# Patient Record
Sex: FEMALE | Race: WHITE | NOT HISPANIC OR LATINO
[De-identification: names, ages, dates, MRNs, and addresses within clinical notes are randomized per-mention and may not be internally consistent; named-entity substitution may affect disease eponyms.]

---

## 2018-04-11 ENCOUNTER — APPOINTMENT (OUTPATIENT)
Dept: VASCULAR SURGERY | Facility: CLINIC | Age: 68
End: 2018-04-11
Payer: MEDICARE

## 2018-04-11 PROBLEM — Z00.00 ENCOUNTER FOR PREVENTIVE HEALTH EXAMINATION: Status: ACTIVE | Noted: 2018-04-11

## 2018-04-11 PROCEDURE — 93970 EXTREMITY STUDY: CPT

## 2019-03-21 ENCOUNTER — RX RENEWAL (OUTPATIENT)
Age: 69
End: 2019-03-21

## 2019-03-21 DIAGNOSIS — J10.1 INFLUENZA DUE TO OTHER IDENTIFIED INFLUENZA VIRUS WITH OTHER RESPIRATORY MANIFESTATIONS: ICD-10-CM

## 2020-09-25 ENCOUNTER — APPOINTMENT (OUTPATIENT)
Dept: CARDIOLOGY | Facility: CLINIC | Age: 70
End: 2020-09-25
Payer: MEDICARE

## 2020-09-25 ENCOUNTER — NON-APPOINTMENT (OUTPATIENT)
Age: 70
End: 2020-09-25

## 2020-09-25 VITALS
HEIGHT: 61 IN | BODY MASS INDEX: 31.72 KG/M2 | RESPIRATION RATE: 15 BRPM | DIASTOLIC BLOOD PRESSURE: 94 MMHG | HEART RATE: 70 BPM | SYSTOLIC BLOOD PRESSURE: 139 MMHG | WEIGHT: 168 LBS

## 2020-09-25 DIAGNOSIS — R07.9 CHEST PAIN, UNSPECIFIED: ICD-10-CM

## 2020-09-25 PROCEDURE — 93306 TTE W/DOPPLER COMPLETE: CPT

## 2020-09-25 PROCEDURE — 93015 CV STRESS TEST SUPVJ I&R: CPT

## 2020-09-25 PROCEDURE — ZZZZZ: CPT

## 2020-09-25 PROCEDURE — 99203 OFFICE O/P NEW LOW 30 MIN: CPT | Mod: 25

## 2020-10-05 PROBLEM — R07.9 CHEST PAIN: Status: ACTIVE | Noted: 2020-09-25

## 2021-05-06 ENCOUNTER — APPOINTMENT (OUTPATIENT)
Dept: SURGERY | Facility: CLINIC | Age: 71
End: 2021-05-06
Payer: MEDICARE

## 2021-05-06 VITALS
SYSTOLIC BLOOD PRESSURE: 164 MMHG | RESPIRATION RATE: 15 BRPM | WEIGHT: 168 LBS | HEART RATE: 68 BPM | DIASTOLIC BLOOD PRESSURE: 84 MMHG | BODY MASS INDEX: 32.98 KG/M2 | HEIGHT: 60 IN | OXYGEN SATURATION: 98 % | TEMPERATURE: 97.7 F

## 2021-05-06 DIAGNOSIS — Z80.3 FAMILY HISTORY OF MALIGNANT NEOPLASM OF BREAST: ICD-10-CM

## 2021-05-06 DIAGNOSIS — K57.90 DIVERTICULOSIS OF INTESTINE, PART UNSPECIFIED, W/OUT PERFORATION OR ABSCESS W/OUT BLEEDING: ICD-10-CM

## 2021-05-06 DIAGNOSIS — Z80.0 FAMILY HISTORY OF MALIGNANT NEOPLASM OF DIGESTIVE ORGANS: ICD-10-CM

## 2021-05-06 DIAGNOSIS — K64.8 OTHER HEMORRHOIDS: ICD-10-CM

## 2021-05-06 DIAGNOSIS — K80.20 CALCULUS OF GALLBLADDER W/OUT CHOLECYSTITIS W/OUT OBSTRUCTION: ICD-10-CM

## 2021-05-06 DIAGNOSIS — K42.9 UMBILICAL HERNIA W/OUT OBSTRUCTION OR GANGRENE: ICD-10-CM

## 2021-05-06 DIAGNOSIS — D17.5 BENIGN LIPOMATOUS NEOPLASM OF INTRA-ABDOMINAL ORGANS: ICD-10-CM

## 2021-05-06 DIAGNOSIS — K63.9 DISEASE OF INTESTINE, UNSPECIFIED: ICD-10-CM

## 2021-05-06 DIAGNOSIS — Z63.4 DISAPPEARANCE AND DEATH OF FAMILY MEMBER: ICD-10-CM

## 2021-05-06 DIAGNOSIS — Z80.1 FAMILY HISTORY OF MALIGNANT NEOPLASM OF TRACHEA, BRONCHUS AND LUNG: ICD-10-CM

## 2021-05-06 DIAGNOSIS — Z78.9 OTHER SPECIFIED HEALTH STATUS: ICD-10-CM

## 2021-05-06 PROCEDURE — 99072 ADDL SUPL MATRL&STAF TM PHE: CPT

## 2021-05-06 PROCEDURE — 99204 OFFICE O/P NEW MOD 45 MIN: CPT

## 2021-05-06 RX ORDER — ZOLPIDEM TARTRATE 5 MG/1
5 TABLET, FILM COATED ORAL
Refills: 0 | Status: ACTIVE | COMMUNITY

## 2021-05-06 RX ORDER — METOPROLOL SUCCINATE 50 MG/1
50 TABLET, EXTENDED RELEASE ORAL
Refills: 0 | Status: ACTIVE | COMMUNITY

## 2021-05-06 RX ORDER — OSELTAMIVIR PHOSPHATE 75 MG/1
75 CAPSULE ORAL DAILY
Qty: 7 | Refills: 0 | Status: DISCONTINUED | COMMUNITY
Start: 2019-03-21 | End: 2021-05-06

## 2021-05-06 SDOH — SOCIAL STABILITY - SOCIAL INSECURITY: DISSAPEARANCE AND DEATH OF FAMILY MEMBER: Z63.4

## 2021-05-06 NOTE — HISTORY OF PRESENT ILLNESS
[FreeTextEntry1] :  Garry is a 72 y/o female here for a consultation for a thickened colon. CT 11/25/20 showed thickening of the distal descending, sigmoid colon most likely due to diverticulitis. CT 4/28/21 showed the wall  of the proximal, mid sigmoid and descending with marked thickening concerning for neoplasm. Last colonoscopy was 5/23/17 with Dr. Jac Callaway. There was a diminutive rectal polyp, severe diverticulosis, significantly angulated, spastic and tortuous left colon precluded complete exam and internal hemorrhoids.\par Patient today reports normal daily bms with no pain or bleeding.  Patient has intermittent crampy abdominal pain as well as diarrhea and constipation.

## 2021-05-06 NOTE — PHYSICAL EXAM
[Thyroid] : the thyroid was normal [Normal Breath Sounds] : Normal breath sounds [Normal Heart Sounds] : normal heart sounds [No Rash or Lesion] : No rash or lesion [Alert] : alert [Oriented to Person] : oriented to person [Oriented to Place] : oriented to place [Oriented to Time] : oriented to time [Anxious] : anxious [Abdomen Masses] : No abdominal masses [Abdomen Tenderness] : ~T No ~M abdominal tenderness [JVD] : no jugular venous distention  [de-identified] : external tags, normal digital exam [de-identified] : wnl [de-identified] : wnl [de-identified] : bilateral ankle edema 1+ [de-identified] : full ROM

## 2021-05-06 NOTE — ASSESSMENT
[FreeTextEntry1] : I have seen and evaluated patient and I have corroborated all nursing input into this note.  Patient with severe left-sided diverticulosis.  She had an incomplete colonoscopy in 2017 because of angulation.  I reviewed the recent virtual colonoscopy and it appears more consistent with chronic diverticular disease.  However, direct inspection would be helpful if it can be performed.  I recommended a repeat colonoscopy with use of a pediatric colonoscope or upper endoscope if needed.  Indications, risk, benefits, alternatives reviewed including but not limited to bleeding, perforation, and incomplete exam.  If a neoplasm is identified or if there is concern about future obstruction because of narrowing then an elective laparoscopic resection will be recommended.  This was discussed with the patient's son who was present for the conversation and with the patient's daughter-in-law who was on the telephone.  All questions were answered.

## 2021-05-07 DIAGNOSIS — Z12.11 ENCOUNTER FOR SCREENING FOR MALIGNANT NEOPLASM OF COLON: ICD-10-CM

## 2021-05-11 DIAGNOSIS — M25.559 PAIN IN UNSPECIFIED HIP: ICD-10-CM

## 2021-06-04 ENCOUNTER — APPOINTMENT (OUTPATIENT)
Dept: DISASTER EMERGENCY | Facility: CLINIC | Age: 71
End: 2021-06-04

## 2021-06-07 ENCOUNTER — APPOINTMENT (OUTPATIENT)
Dept: SURGERY | Facility: HOSPITAL | Age: 71
End: 2021-06-07
Payer: MEDICARE

## 2021-06-07 ENCOUNTER — OUTPATIENT (OUTPATIENT)
Dept: OUTPATIENT SERVICES | Facility: HOSPITAL | Age: 71
LOS: 1 days | End: 2021-06-07
Payer: COMMERCIAL

## 2021-06-07 ENCOUNTER — RESULT REVIEW (OUTPATIENT)
Age: 71
End: 2021-06-07

## 2021-06-07 VITALS
SYSTOLIC BLOOD PRESSURE: 132 MMHG | HEART RATE: 53 BPM | OXYGEN SATURATION: 97 % | RESPIRATION RATE: 15 BRPM | DIASTOLIC BLOOD PRESSURE: 57 MMHG

## 2021-06-07 VITALS
TEMPERATURE: 98 F | DIASTOLIC BLOOD PRESSURE: 107 MMHG | RESPIRATION RATE: 23 BRPM | HEIGHT: 61 IN | OXYGEN SATURATION: 95 % | SYSTOLIC BLOOD PRESSURE: 154 MMHG | HEART RATE: 70 BPM | WEIGHT: 167.99 LBS

## 2021-06-07 DIAGNOSIS — Z90.710 ACQUIRED ABSENCE OF BOTH CERVIX AND UTERUS: Chronic | ICD-10-CM

## 2021-06-07 DIAGNOSIS — K63.9 DISEASE OF INTESTINE, UNSPECIFIED: ICD-10-CM

## 2021-06-07 DIAGNOSIS — Z90.49 ACQUIRED ABSENCE OF OTHER SPECIFIED PARTS OF DIGESTIVE TRACT: Chronic | ICD-10-CM

## 2021-06-07 PROCEDURE — 45380 COLONOSCOPY AND BIOPSY: CPT

## 2021-06-07 PROCEDURE — 45331 SIGMOIDOSCOPY AND BIOPSY: CPT

## 2021-06-07 PROCEDURE — 88305 TISSUE EXAM BY PATHOLOGIST: CPT | Mod: 26

## 2021-06-07 PROCEDURE — 88305 TISSUE EXAM BY PATHOLOGIST: CPT

## 2021-06-07 RX ORDER — SODIUM CHLORIDE 9 MG/ML
500 INJECTION INTRAMUSCULAR; INTRAVENOUS; SUBCUTANEOUS
Refills: 0 | Status: DISCONTINUED | OUTPATIENT
Start: 2021-06-07 | End: 2021-06-21

## 2021-06-07 RX ADMIN — SODIUM CHLORIDE 75 MILLILITER(S): 9 INJECTION INTRAMUSCULAR; INTRAVENOUS; SUBCUTANEOUS at 13:34

## 2021-06-07 NOTE — ASU DISCHARGE PLAN (ADULT/PEDIATRIC) - CARE PROVIDER_API CALL
Bo Retana)  ColonRectal Surgery; Surgery  [address] [facility], [address]  [place] [address]  Phone: [phone]  Fax: [phone]  Follow Up Time: 1 week

## 2021-06-07 NOTE — PRE PROCEDURE NOTE - PRE PROCEDURE EVALUATION
Attending Physician: Dr. Retana                           Procedure: Colonoscopy    Indication for Procedure: Screening / hx of incomplete colonoscopy 2017  ________________________________________________________  PAST MEDICAL & SURGICAL HISTORY:  HTN (hypertension)    Diverticula of colon    H/O total hysterectomy    History of cholecystectomy      ALLERGIES:  No Known Allergies    HOME MEDICATIONS:  amLODIPine 5 mg oral tablet: 1 tab(s) orally once a day  hydroCHLOROthiazide 12.5 mg oral capsule: 1 cap(s) orally once a day  metoprolol succinate 50 mg oral tablet, extended release: 1 tab(s) orally once a day  telmisartan 80 mg oral tablet: 1 tab(s) orally once a day    AICD/PPM: [ ] yes   [ ] no    PERTINENT LAB DATA:                      PHYSICAL EXAMINATION:    Height (cm): 154.9  Weight (kg): 76.2  BMI (kg/m2): 31.8  BSA (m2): 1.75T(C): 36.9  HR: 70  BP: 154/107  RR: 23  SpO2: 95%    Constitutional: NAD  Respiratory: Normal respiratory effort  Cardiovascular: Appears well perfused  Gastrointestinal: soft, NT/ND  Neurological: Awake, alert, no focal deficits  Psychiatric: Normal mood, normal affect  Skin: No rashes    COMMENTS:    The patient is a suitable candidate for the planned procedure

## 2021-06-07 NOTE — ASU PATIENT PROFILE, ADULT - PMH
If you have an active [facility] account, please look for your well child questionnaire to come to your [facility] account before your next well child visit.    Well-Child Checkup: 4 Years     Bicycle safety equipment, such as a helmet, helps keep your child safe.     Even if your child is healthy, keep taking him or her for yearly checkups. This ensures your childs health is protected with scheduled vaccinations and health screenings. Your healthcare provider can make sure your childs growth and development is progressing well. This sheet describes some of what you can expect.  Development and milestones  The healthcare provider will ask questions and observe your childs behavior to get an idea of his or her development. By this visit, your child is likely doing some of the following:  · Enjoy and cooperate with other children  · Talk about what he or she likes (for example, toys, games, people)  · Tell a story, or singing a song  · Recognize most colors and shapes  · Say first and last name  · Use scissors  · Draw a  person with 2 to 4 body parts  · Catch a ball that is bounced to him or her, most of the time  · Stand briefly on one foot  School and social issues  The healthcare provider will ask how your child is getting along with other kids. Talk about your childs experience in group settings such as . If your child isnt in , you could talk instead about behavior at  or during play dates. You may also want to discuss  options and how to help prepare your child for . The healthcare provider may ask about:  · Behavior and participation in group settings. How does your child act at school (or other group setting)? Does he or she follow the routine and take part in group activities? What do teachers or caregivers say about the childs behavior?  · Behavior at home. How does the child act at home? Is behavior at home better or worse than at school? (Be aware that  its common for kids to be better behaved at school than at home.)  · Friendships. Has your child made friends with other children? What are the kids like? How does your child get along with these friends?  · Play. How does the child like to play? For example, does he or she play make believe? Does the child interact with others during playtime?  · [place]. How is your child adjusting to school? How does he or she react when you leave? (Some anxiety is normal. This should subside over time, as the child becomes more independent.)  Nutrition and exercise tips  Healthy eating and activity are two important keys to a healthy future. Its not too early to start teaching your child healthy habits that will last a lifetime. Here are some things you can do:  · Limit juice and sports drinks. These drinks--even pure fruit juice--have too much sugar, which leads to unhealthy weight gain and tooth decay. Water and low-fat or nonfat milk are best to drink. Limit juice to a small glass of 100% juice each day, such as during a meal.  · Dont serve soda. Its healthiest not to let your child have soda. If you do allow soda, save it for very special occasions.  · Offer nutritious foods. Keep a variety of healthy foods on hand for snacks, such as fresh fruits and vegetables, lean meats, and whole grains. Foods like French fries, candy, and snack foods should only be served rarely.  · Serve child-sized portions. Children dont need as much food as adults. Serve your child portions that make sense for his or her age. Let your child stop eating when he or she is full. If the child is still hungry after a meal, offer more vegetables or fruit. It's OK to put limits on how much your child eats.  · Encourage at least 30 minutes to 60 minutes of active play per day. Moving around helps keep your child healthy. Bring your child to the park, ride bikes, or play active games like tag or ball.  · Limit screen time to 1 hour to 2 hours  each day. This includes TV watching, computer use, and video games.  · Ask the healthcare provider about your childs weight. At this age, your child should gain about 4 pounds to 5 pounds each year. If he or she is gaining more than that, talk to the health care provider about healthy eating habits and activity guidelines.  · Take your child to the dentist at least twice a year for teeth cleaning and a checkup.  Safety tips  · When riding a bike, your child should wear a helmet with the strap fastened. While roller-skating or using a scooter or skateboard, its safest to wear wrist guards, elbow pads, and knee pads, and a helmet.  · Keep using a car seat until your child outgrows it. (For many children, this happens around age 4 and a weight of at least 40 pounds.) Ask the health care provider if there are state laws regarding car seat use that you need to know about.  · Once your child outgrows the car seat, switch to a high-back booster seat. This allows the seat belt to fit properly. A booster seat should be used until your child is 4 feet 9 inches tall and between 8 and 12 years of age. All children younger than 13 years old should sit in the back seat.  · Teach your child not to talk to or go anywhere with a stranger.  · Start to teach your child his or her phone number, address, and parents first names. These are important to know in an emergency.  · Teach your child to swim. Many communities offer low-cost swimming lessons.  · If you have a swimming pool, it should be entirely fenced on all sides. [place] or doors leading to the pool should be closed and locked. Do not let your child play in or around the pool unattended, even if he or she knows how to swim.  Vaccinations  Based on recommendations from the Centers for Disease Control and Prevention (CDC), at this visit your child may receive the following vaccinations:  · Diphtheria, tetanus, and pertussis  · Influenza (flu), annually  · Measles, mumps, and  rubella  · Polio  · Varicella (chickenpox)  Give your child positive reinforcement  Its easy to tell a child what theyre doing wrong. Its often harder to remember to praise a child for what they do right. Positive reinforcement (rewarding good behavior) helps your child develop confidence and a healthy self-esteem. Here are some tips:  · Give the child praise and attention for behaving well. When appropriate, make sure the whole family knows that the child has done well.  · Reward good behavior with hugs, kisses, and small gifts (such as stickers). When being good has rewards, kids will keep doing those behaviors to get the rewards. Avoid using sweets or candy as rewards. Using these treats as positive reinforcement can lead to unhealthy eating habits and an emotional attachment to food.  · When the child doesnt act the way you want, dont label the child as bad or naughty. Instead, describe why the action is not acceptable. (For example, say Its not nice to hit instead of Youre a bad girl.) When your child chooses the right behavior over the wrong one (such as walking away instead of hitting), remember to praise the good choice!  · Pledge to say 5 nice things to your child every day. Then do it!      Next checkup at: _______________________________     PARENT NOTES:  Date Last Reviewed: 10/1/2014  © 2077-7659 [employer]. [address] [place] [address], [place] [address]. All rights reserved. This information is not intended as a substitute for professional medical care. Always follow your healthcare professional's instructions.         Diverticula of colon    HTN (hypertension)

## 2021-06-08 PROBLEM — K57.30 DIVERTICULOSIS OF LARGE INTESTINE WITHOUT PERFORATION OR ABSCESS WITHOUT BLEEDING: Chronic | Status: ACTIVE | Noted: 2021-06-07

## 2021-06-08 PROBLEM — I10 ESSENTIAL (PRIMARY) HYPERTENSION: Chronic | Status: ACTIVE | Noted: 2021-06-07

## 2021-06-08 LAB — SURGICAL PATHOLOGY STUDY: SIGNIFICANT CHANGE UP

## 2021-06-17 ENCOUNTER — APPOINTMENT (OUTPATIENT)
Dept: SURGERY | Facility: CLINIC | Age: 71
End: 2021-06-17
Payer: MEDICARE

## 2021-06-17 DIAGNOSIS — K57.32 DIVERTICULITIS OF LARGE INTESTINE W/OUT PERFORATION OR ABSCESS W/OUT BLEEDING: ICD-10-CM

## 2021-06-17 PROCEDURE — 99214 OFFICE O/P EST MOD 30 MIN: CPT | Mod: 95

## 2021-06-17 NOTE — HISTORY OF PRESENT ILLNESS
[Home] : at home, [unfilled] , at the time of the visit. [Medical Office: ([facility])___] : at the medical office located in  [Verbal consent obtained from patient] : the patient, [unfilled] [FreeTextEntry1] : Garry is a 70 y/o female being seen for a follow-up visit. Colonoscopy from 6/7/21 demonstrates hemorrhoids on perianal exam. Diverticulosis in the sigmoid colon. One 5 mm polyp in the sigmoid colon, removed with a jumbo cold forceps. Pathology: colonic mucosa with hyperplastic change. \par Colonoscopy from 5/23/2017 demonstrates- diminutive polyp. Severe diverticulosis. Significantly angulated, spastic and tortuous left colon- precluded complete exam internal hemorrhoids. Pathology: hyperplastic polyp.

## 2021-06-17 NOTE — ASSESSMENT
[FreeTextEntry1] : Patient with sharply angulated narrowed sigmoid colon precluding optical colonoscopy.  She also has thickening of the sigmoid which is most likely from chronic diverticular disease.  However, neoplasm cannot be completely ruled out.  Therefore, because of the narrowing and the risk of future obstruction, and because of the inability to survey the narrowed thickened colon, a laparoscopic possible open anterior resection was recommended.  Indications, risks, benefits, alternatives reviewed including but not limited to bleeding, infection, anastomotic leak, ostomy, cystoscopy and ureteral catheters, recurrence, and change in bowel habits.  All questions were answered.

## 2021-07-26 ENCOUNTER — NON-APPOINTMENT (OUTPATIENT)
Age: 71
End: 2021-07-26

## 2021-07-26 ENCOUNTER — OUTPATIENT (OUTPATIENT)
Dept: OUTPATIENT SERVICES | Facility: HOSPITAL | Age: 71
LOS: 1 days | End: 2021-07-26
Payer: MEDICARE

## 2021-07-26 ENCOUNTER — APPOINTMENT (OUTPATIENT)
Dept: CARDIOLOGY | Facility: CLINIC | Age: 71
End: 2021-07-26
Payer: MEDICARE

## 2021-07-26 VITALS
OXYGEN SATURATION: 96 % | WEIGHT: 171.08 LBS | DIASTOLIC BLOOD PRESSURE: 80 MMHG | RESPIRATION RATE: 20 BRPM | HEART RATE: 57 BPM | SYSTOLIC BLOOD PRESSURE: 135 MMHG | HEIGHT: 61 IN | TEMPERATURE: 98 F

## 2021-07-26 VITALS
HEART RATE: 61 BPM | RESPIRATION RATE: 15 BRPM | WEIGHT: 168 LBS | TEMPERATURE: 98.1 F | OXYGEN SATURATION: 95 % | HEIGHT: 60 IN | BODY MASS INDEX: 32.98 KG/M2

## 2021-07-26 DIAGNOSIS — K57.32 DIVERTICULITIS OF LARGE INTESTINE WITHOUT PERFORATION OR ABSCESS WITHOUT BLEEDING: ICD-10-CM

## 2021-07-26 DIAGNOSIS — K57.90 DIVERTICULOSIS OF INTESTINE, PART UNSPECIFIED, WITHOUT PERFORATION OR ABSCESS WITHOUT BLEEDING: ICD-10-CM

## 2021-07-26 DIAGNOSIS — R01.1 CARDIAC MURMUR, UNSPECIFIED: ICD-10-CM

## 2021-07-26 DIAGNOSIS — Z98.49 CATARACT EXTRACTION STATUS, UNSPECIFIED EYE: Chronic | ICD-10-CM

## 2021-07-26 DIAGNOSIS — Z90.710 ACQUIRED ABSENCE OF BOTH CERVIX AND UTERUS: Chronic | ICD-10-CM

## 2021-07-26 DIAGNOSIS — Z01.818 ENCOUNTER FOR OTHER PREPROCEDURAL EXAMINATION: ICD-10-CM

## 2021-07-26 DIAGNOSIS — I10 ESSENTIAL (PRIMARY) HYPERTENSION: ICD-10-CM

## 2021-07-26 DIAGNOSIS — Z90.49 ACQUIRED ABSENCE OF OTHER SPECIFIED PARTS OF DIGESTIVE TRACT: Chronic | ICD-10-CM

## 2021-07-26 DIAGNOSIS — K63.9 DISEASE OF INTESTINE, UNSPECIFIED: ICD-10-CM

## 2021-07-26 DIAGNOSIS — Z29.9 ENCOUNTER FOR PROPHYLACTIC MEASURES, UNSPECIFIED: ICD-10-CM

## 2021-07-26 LAB
A1C WITH ESTIMATED AVERAGE GLUCOSE RESULT: 5.8 % — HIGH (ref 4–5.6)
ANION GAP SERPL CALC-SCNC: 12 MMOL/L — SIGNIFICANT CHANGE UP (ref 5–17)
BLD GP AB SCN SERPL QL: NEGATIVE — SIGNIFICANT CHANGE UP
BUN SERPL-MCNC: 17 MG/DL — SIGNIFICANT CHANGE UP (ref 7–23)
CALCIUM SERPL-MCNC: 10.1 MG/DL — SIGNIFICANT CHANGE UP (ref 8.4–10.5)
CHLORIDE SERPL-SCNC: 103 MMOL/L — SIGNIFICANT CHANGE UP (ref 96–108)
CO2 SERPL-SCNC: 25 MMOL/L — SIGNIFICANT CHANGE UP (ref 22–31)
CREAT SERPL-MCNC: 0.65 MG/DL — SIGNIFICANT CHANGE UP (ref 0.5–1.3)
ESTIMATED AVERAGE GLUCOSE: 120 MG/DL — HIGH (ref 68–114)
GLUCOSE SERPL-MCNC: 101 MG/DL — HIGH (ref 70–99)
HCT VFR BLD CALC: 36.8 % — SIGNIFICANT CHANGE UP (ref 34.5–45)
HGB BLD-MCNC: 11.4 G/DL — LOW (ref 11.5–15.5)
MCHC RBC-ENTMCNC: 28.5 PG — SIGNIFICANT CHANGE UP (ref 27–34)
MCHC RBC-ENTMCNC: 31 GM/DL — LOW (ref 32–36)
MCV RBC AUTO: 92 FL — SIGNIFICANT CHANGE UP (ref 80–100)
NRBC # BLD: 0 /100 WBCS — SIGNIFICANT CHANGE UP (ref 0–0)
PLATELET # BLD AUTO: 316 K/UL — SIGNIFICANT CHANGE UP (ref 150–400)
POTASSIUM SERPL-MCNC: 4.8 MMOL/L — SIGNIFICANT CHANGE UP (ref 3.5–5.3)
POTASSIUM SERPL-SCNC: 4.8 MMOL/L — SIGNIFICANT CHANGE UP (ref 3.5–5.3)
RBC # BLD: 4 M/UL — SIGNIFICANT CHANGE UP (ref 3.8–5.2)
RBC # FLD: 13 % — SIGNIFICANT CHANGE UP (ref 10.3–14.5)
RH IG SCN BLD-IMP: POSITIVE — SIGNIFICANT CHANGE UP
SODIUM SERPL-SCNC: 140 MMOL/L — SIGNIFICANT CHANGE UP (ref 135–145)
WBC # BLD: 8.42 K/UL — SIGNIFICANT CHANGE UP (ref 3.8–10.5)
WBC # FLD AUTO: 8.42 K/UL — SIGNIFICANT CHANGE UP (ref 3.8–10.5)

## 2021-07-26 PROCEDURE — 80048 BASIC METABOLIC PNL TOTAL CA: CPT

## 2021-07-26 PROCEDURE — 83036 HEMOGLOBIN GLYCOSYLATED A1C: CPT

## 2021-07-26 PROCEDURE — G0463: CPT

## 2021-07-26 PROCEDURE — 86850 RBC ANTIBODY SCREEN: CPT

## 2021-07-26 PROCEDURE — 86901 BLOOD TYPING SEROLOGIC RH(D): CPT

## 2021-07-26 PROCEDURE — 99214 OFFICE O/P EST MOD 30 MIN: CPT

## 2021-07-26 PROCEDURE — 93000 ELECTROCARDIOGRAM COMPLETE: CPT | Mod: NC

## 2021-07-26 PROCEDURE — 85027 COMPLETE CBC AUTOMATED: CPT

## 2021-07-26 PROCEDURE — 86900 BLOOD TYPING SEROLOGIC ABO: CPT

## 2021-07-26 PROCEDURE — 99072 ADDL SUPL MATRL&STAF TM PHE: CPT

## 2021-07-26 PROCEDURE — 87086 URINE CULTURE/COLONY COUNT: CPT

## 2021-07-26 RX ORDER — LIDOCAINE HCL 20 MG/ML
0.2 VIAL (ML) INJECTION ONCE
Refills: 0 | Status: DISCONTINUED | OUTPATIENT
Start: 2021-08-09 | End: 2021-08-11

## 2021-07-26 RX ORDER — AMLODIPINE BESYLATE 2.5 MG/1
1 TABLET ORAL
Qty: 0 | Refills: 0 | DISCHARGE

## 2021-07-26 RX ORDER — CHLORHEXIDINE GLUCONATE 213 G/1000ML
1 SOLUTION TOPICAL ONCE
Refills: 0 | Status: DISCONTINUED | OUTPATIENT
Start: 2021-08-09 | End: 2021-08-11

## 2021-07-26 RX ORDER — CEFOTETAN DISODIUM 1 G
2 VIAL (EA) INJECTION ONCE
Refills: 0 | Status: DISCONTINUED | OUTPATIENT
Start: 2021-08-09 | End: 2021-08-10

## 2021-07-26 RX ORDER — CELECOXIB 200 MG/1
400 CAPSULE ORAL ONCE
Refills: 0 | Status: DISCONTINUED | OUTPATIENT
Start: 2021-08-09 | End: 2021-08-10

## 2021-07-26 NOTE — H&P PST ADULT - NSICDXPASTSURGICALHX_GEN_ALL_CORE_FT
PAST SURGICAL HISTORY:  S/P cataract surgery bilateral    S/P cholecystectomy     S/P vaginal hysterectomy

## 2021-07-26 NOTE — H&P PST ADULT - ASSESSMENT
MARYI VTE 2.0 SCORE [CLOT updated 2019]    AGE RELATED RISK FACTORS                                                       MOBILITY RELATED FACTORS  [ ] Age 41-60 years                                            (1 Point)                    [ ] Bed rest                                                        (1 Point)  [x ] Age: 61-74 years                                           (2 Points)                  [ ] Plaster cast                                                   (2 Points)  [ ] Age= 75 years                                              (3 Points)                    [ ] Bed bound for more than 72 hours                 (2 Points)    DISEASE RELATED RISK FACTORS                                               GENDER SPECIFIC FACTORS  [ ] Edema in the lower extremities                       (1 Point)              [ ] Pregnancy                                                     (1 Point)  [ ] Varicose veins                                               (1 Point)                     [ ] Post-partum < 6 weeks                                   (1 Point)             [x ] BMI > 25 Kg/m2                                            (1 Point)                     [ ] Hormonal therapy  or oral contraception          (1 Point)                 [ ] Sepsis (in the previous month)                        (1 Point)               [ ] History of pregnancy complications                 (1 point)  [ ] Pneumonia or serious lung disease                                               [ ] Unexplained or recurrent                     (1 Point)           (in the previous month)                               (1 Point)  [ ] Abnormal pulmonary function test                     (1 Point)                 SURGERY RELATED RISK FACTORS  [ ] Acute myocardial infarction                              (1 Point)               [ ]  Section                                             (1 Point)  [ ] Congestive heart failure (in the previous month)  (1 Point)      [ ] Minor surgery                                                  (1 Point)   [x ] Inflammatory bowel disease                             (1 Point)               [ ] Arthroscopic surgery                                        (2 Points)  [ ] Central venous access                                      (2 Points)                [ x] General surgery lasting more than 45 minutes (2 points)  [ ] Malignancy- Present or previous                   (2 Points)                [ ] Elective arthroplasty                                         (5 points)    [ ] Stroke (in the previous month)                          (5 Points)                                                                                                                                                           HEMATOLOGY RELATED FACTORS                                                 TRAUMA RELATED RISK FACTORS  [ ] Prior episodes of VTE                                     (3 Points)                [ ] Fracture of the hip, pelvis, or leg                       (5 Points)  [ ] Positive family history for VTE                         (3 Points)             [ ] Acute spinal cord injury (in the previous month)  (5 Points)  [ ] Prothrombin 61868 A                                     (3 Points)               [ ] Paralysis  (less than 1 month)                             (5 Points)  [ ] Factor V Leiden                                             (3 Points)                  [ ] Multiple Trauma within 1 month                        (5 Points)  [ ] Lupus anticoagulants                                     (3 Points)                                                           [ ] Anticardiolipin antibodies                               (3 Points)                                                       [ ] High homocysteine in the blood                      (3 Points)                                             [ ] Other congenital or acquired thrombophilia      (3 Points)                                                [ ] Heparin induced thrombocytopenia                  (3 Points)                                     Total Score [    6      ]

## 2021-07-26 NOTE — PHYSICAL EXAM
[General Appearance - Well Developed] : well developed [Normal Appearance] : normal appearance [Well Groomed] : well groomed [General Appearance - Well Nourished] : well nourished [No Deformities] : no deformities [General Appearance - In No Acute Distress] : no acute distress [Normal Conjunctiva] : the conjunctiva exhibited no abnormalities [Normal Oral Mucosa] : normal oral mucosa [Normal Oropharynx] : normal oropharynx [Normal Jugular Venous A Waves Present] : normal jugular venous A waves present [Normal Jugular Venous V Waves Present] : normal jugular venous V waves present [No Jugular Venous Leija A Waves] : no jugular venous leija A waves [5th Left ICS - MCL] : palpated at the 5th LICS in the midclavicular line [Normal] : normal [No Precordial Heave] : no precordial heave was noted [Normal Rate] : normal [Rhythm Regular] : regular [Normal S1] : normal S1 [Normal S2] : normal S2 [No Gallop] : no gallop heard [II] : a grade 2 [I] : a grade 1 [2+] : left 2+ [No Abnormalities] : the abdominal aorta was not enlarged and no bruit was heard [Respiration, Rhythm And Depth] : normal respiratory rhythm and effort [Exaggerated Use Of Accessory Muscles For Inspiration] : no accessory muscle use [Auscultation Breath Sounds / Voice Sounds] : lungs were clear to auscultation bilaterally [Bowel Sounds] : normal bowel sounds [Abdomen Soft] : soft [Abnormal Walk] : normal gait [Gait - Sufficient For Exercise Testing] : the gait was sufficient for exercise testing [Nail Clubbing] : no clubbing of the fingernails [Cyanosis, Localized] : no localized cyanosis [Skin Color & Pigmentation] : normal skin color and pigmentation [Skin Turgor] : normal skin turgor [] : no rash [Oriented To Time, Place, And Person] : oriented to person, place, and time [Affect] : the affect was normal [Mood] : the mood was normal [No Anxiety] : not feeling anxious [S3] : no S3 [S4] : no S4 [Click] : no click [Pericardial Rub] : no pericardial rub [Right Carotid Bruit] : no bruit heard over the right carotid [Left Carotid Bruit] : no bruit heard over the left carotid [Right Femoral Bruit] : no bruit heard over the right femoral artery [Left Femoral Bruit] : no bruit heard over the left femoral artery [___ +] : bilateral [unfilled]U+ pitting edema to the ankles

## 2021-07-26 NOTE — H&P PST ADULT - NSICDXPROBLEM_GEN_ALL_CORE_FT
PROBLEM DIAGNOSES  Problem: Diverticulosis  Assessment and Plan:     Problem: HTN (hypertension)  Assessment and Plan:     Problem: Need for prophylactic measure  Assessment and Plan:        PROBLEM DIAGNOSES  Problem: Diverticulosis  Assessment and Plan: ERP, Laparoscopic anterior resection, Possible open on 8/9/2021.       Problem: HTN (hypertension)  Assessment and Plan: Continue on antihypertensive medication      Problem: Need for prophylactic measure  Assessment and Plan: The Caprini score indicates that this patient is at high risk for a VTE event (score 6 or greater). Surgical patients in this group will benefit from both pharmacologic prophylaxis and intermittent compression devices.  The surgical team will determine the balance between VTE risk and bleeding risk, and other clinical considerations

## 2021-07-26 NOTE — REASON FOR VISIT
[CV Risk Factors and Non-Cardiac Disease] : CV risk factors and non-cardiac disease [Hyperlipidemia] : hyperlipidemia [Other: ____] : [unfilled] [Consultation] : a consultation regarding [Chest Pain] : chest pain [Hypertension] : hypertension [FreeTextEntry1] : murmur

## 2021-07-26 NOTE — H&P PST ADULT - HISTORY OF PRESENT ILLNESS
Denies Recent travel, Exposure or Covid symptoms  Covid vaccine in chart  71 year old male with history of HTN , diverticulosis , had incomplete colonoscopy due to angulation had CT in 2020- revealed thickening of distal descending sigmoid colon most likely due to diverticulosis, had CT 4/2021- Showed wall of proximal, sigmoid and descending with marked thickening . Now coming in for ERP, Laparoscopic anterior resection, Possible open on 8/9/2021.     Denies Recent travel, Exposure or Covid symptoms  Covid vaccine-last dose 1/30/2021 71 year old male with history of HTN , diverticulosis , had incomplete colonoscopy due to angulation had CT in 2020- revealed thickening of distal descending sigmoid colon most likely due to diverticulosis, had CT 4/2021- Showed wall of proximal, sigmoid and descending with marked thickening and narrowing, colonoscopy unable to be performed because of narrowing and partial obstruction . Now coming in for ERP, Laparoscopic anterior resection, Possible open on 8/9/2021.     Denies Recent travel, Exposure or Covid symptoms  Covid vaccine-last dose 1/30/2021

## 2021-07-26 NOTE — H&P PST ADULT - NSICDXPASTMEDICALHX_GEN_ALL_CORE_FT
PAST MEDICAL HISTORY:  Diverticula of colon     HTN (hypertension)     Pedal edema possibly from amlodipine    Right hip pain      PAST MEDICAL HISTORY:  Diverticula of colon     Diverticulosis of intestine     HTN (hypertension)     Obesity     Pedal edema possibly from amlodipine    Right hip pain

## 2021-07-26 NOTE — DISCUSSION/SUMMARY
[FreeTextEntry1] : This is a 71-year-old female with past medical history significant for status post cholecystectomy, significant diverticulosis, sigmoid polyp, hypertension, who comes in for cardiac preoperative consultation.  She denies shortness of breath, palpitations, dizziness or syncope.\par The patient is scheduled for laparoscopic and possible open colon procedure scheduled for the next 2 weeks.\par She is complaining of bilateral pedal edema which is most likely secondary to her amlodipine therapy.\par Her cardiac risk factors include hypertension.\par She was born in [place] and has no history of rheumatic fever.  \par She had a normal exercise stress test September 25, 2020.\par Her blood pressure is under good control.  Given the fact she is going for surgery I would continue her amlodipine 5 mg in the evening, and Micardis 80 mg in the morning.\par The ultimate plan is to discontinue the Norvasc therapy and place her on combination Micardis hydrochlorothiazide 80/12.5 mg/day.  This can be done after surgery.\par Electrocardiogram done July 26, 2021 demonstrate normal sinus rhythm rate is 60 bpm is otherwise unremarkable.\par Echo Doppler examination done September 25, 2020 demonstrated mild mitral, tricuspid, and pulmonic valve regurgitation with trace aortic valve regurgitation, and normal left ventricular ejection fraction of 60 to 65%.\par \par \par This patient is cleared from a cardiac standpoint for surgery pending acceptable preoperative laboratory values.  Please avoid overhydration.  Maintain prophylaxis for deep venous thrombosis.  The patient should have an incentive spirometer in the perioperative period.  The patient should be allowed to take her usual medications in the perioperative period.\par \par \par \par

## 2021-07-26 NOTE — REVIEW OF SYSTEMS
[Negative] : Heme/Lymph [SOB] : no shortness of breath [Dyspnea on exertion] : not dyspnea during exertion [Chest Discomfort] : no chest discomfort [Lower Ext Edema] : lower extremity edema [Leg Claudication] : no intermittent leg claudication [Palpitations] : no palpitations [Orthopnea] : no orthopnea [PND] : no PND [Syncope] : no syncope

## 2021-07-27 LAB
CULTURE RESULTS: SIGNIFICANT CHANGE UP
SPECIMEN SOURCE: SIGNIFICANT CHANGE UP

## 2021-07-27 RX ORDER — TELMISARTAN 80 MG/1
80 TABLET ORAL DAILY
Qty: 90 | Refills: 1 | Status: ACTIVE | COMMUNITY
Start: 2020-09-25

## 2021-08-02 PROBLEM — K57.90 DIVERTICULOSIS OF INTESTINE, PART UNSPECIFIED, WITHOUT PERFORATION OR ABSCESS WITHOUT BLEEDING: Chronic | Status: ACTIVE | Noted: 2021-07-26

## 2021-08-02 PROBLEM — M25.551 PAIN IN RIGHT HIP: Chronic | Status: ACTIVE | Noted: 2021-07-26

## 2021-08-02 PROBLEM — E66.9 OBESITY, UNSPECIFIED: Chronic | Status: ACTIVE | Noted: 2021-07-26

## 2021-08-02 PROBLEM — R60.0 LOCALIZED EDEMA: Chronic | Status: ACTIVE | Noted: 2021-07-26

## 2021-08-08 ENCOUNTER — TRANSCRIPTION ENCOUNTER (OUTPATIENT)
Age: 71
End: 2021-08-08

## 2021-08-09 ENCOUNTER — RESULT REVIEW (OUTPATIENT)
Age: 71
End: 2021-08-09

## 2021-08-09 ENCOUNTER — APPOINTMENT (OUTPATIENT)
Dept: SURGERY | Facility: HOSPITAL | Age: 71
End: 2021-08-09
Payer: COMMERCIAL

## 2021-08-09 ENCOUNTER — INPATIENT (INPATIENT)
Facility: HOSPITAL | Age: 71
LOS: 1 days | Discharge: HOME | DRG: 330 | End: 2021-08-11
Payer: MEDICARE

## 2021-08-09 VITALS
RESPIRATION RATE: 18 BRPM | HEART RATE: 83 BPM | OXYGEN SATURATION: 95 % | WEIGHT: 171.08 LBS | SYSTOLIC BLOOD PRESSURE: 137 MMHG | TEMPERATURE: 99 F | HEIGHT: 61 IN | DIASTOLIC BLOOD PRESSURE: 76 MMHG

## 2021-08-09 DIAGNOSIS — Z90.49 ACQUIRED ABSENCE OF OTHER SPECIFIED PARTS OF DIGESTIVE TRACT: Chronic | ICD-10-CM

## 2021-08-09 DIAGNOSIS — Z98.49 CATARACT EXTRACTION STATUS, UNSPECIFIED EYE: Chronic | ICD-10-CM

## 2021-08-09 DIAGNOSIS — K57.32 DIVERTICULITIS OF LARGE INTESTINE WITHOUT PERFORATION OR ABSCESS WITHOUT BLEEDING: ICD-10-CM

## 2021-08-09 DIAGNOSIS — K63.9 DISEASE OF INTESTINE, UNSPECIFIED: ICD-10-CM

## 2021-08-09 DIAGNOSIS — Z90.710 ACQUIRED ABSENCE OF BOTH CERVIX AND UTERUS: Chronic | ICD-10-CM

## 2021-08-09 LAB
GLUCOSE BLDC GLUCOMTR-MCNC: 144 MG/DL — HIGH (ref 70–99)
RH IG SCN BLD-IMP: POSITIVE — SIGNIFICANT CHANGE UP

## 2021-08-09 PROCEDURE — 88305 TISSUE EXAM BY PATHOLOGIST: CPT | Mod: 26

## 2021-08-09 PROCEDURE — 44213 LAP MOBIL SPLENIC FL ADD-ON: CPT

## 2021-08-09 PROCEDURE — 88304 TISSUE EXAM BY PATHOLOGIST: CPT | Mod: 26

## 2021-08-09 PROCEDURE — 88331 PATH CONSLTJ SURG 1 BLK 1SPC: CPT | Mod: 26

## 2021-08-09 PROCEDURE — 88307 TISSUE EXAM BY PATHOLOGIST: CPT | Mod: 26

## 2021-08-09 PROCEDURE — 44207 L COLECTOMY/COLOPROCTOSTOMY: CPT

## 2021-08-09 RX ORDER — ONDANSETRON 8 MG/1
4 TABLET, FILM COATED ORAL EVERY 6 HOURS
Refills: 0 | Status: DISCONTINUED | OUTPATIENT
Start: 2021-08-09 | End: 2021-08-10

## 2021-08-09 RX ORDER — ZOLPIDEM TARTRATE 10 MG/1
5 TABLET ORAL AT BEDTIME
Refills: 0 | Status: DISCONTINUED | OUTPATIENT
Start: 2021-08-09 | End: 2021-08-11

## 2021-08-09 RX ORDER — OXYCODONE HYDROCHLORIDE 5 MG/1
5 TABLET ORAL
Refills: 0 | Status: DISCONTINUED | OUTPATIENT
Start: 2021-08-09 | End: 2021-08-09

## 2021-08-09 RX ORDER — ERYTHROMYCIN BASE 5 MG/GRAM
1 OINTMENT (GRAM) OPHTHALMIC (EYE) AT BEDTIME
Refills: 0 | Status: DISCONTINUED | OUTPATIENT
Start: 2021-08-09 | End: 2021-08-11

## 2021-08-09 RX ORDER — NALOXONE HYDROCHLORIDE 4 MG/.1ML
0.1 SPRAY NASAL
Refills: 0 | Status: DISCONTINUED | OUTPATIENT
Start: 2021-08-09 | End: 2021-08-10

## 2021-08-09 RX ORDER — IBUPROFEN 200 MG
400 TABLET ORAL EVERY 6 HOURS
Refills: 0 | Status: DISCONTINUED | OUTPATIENT
Start: 2021-08-10 | End: 2021-08-10

## 2021-08-09 RX ORDER — AMLODIPINE BESYLATE 2.5 MG/1
1 TABLET ORAL
Qty: 0 | Refills: 0 | DISCHARGE

## 2021-08-09 RX ORDER — OXYCODONE HYDROCHLORIDE 5 MG/1
10 TABLET ORAL
Refills: 0 | Status: DISCONTINUED | OUTPATIENT
Start: 2021-08-09 | End: 2021-08-09

## 2021-08-09 RX ORDER — METOPROLOL TARTRATE 50 MG
1 TABLET ORAL
Qty: 0 | Refills: 0 | DISCHARGE

## 2021-08-09 RX ORDER — MORPHINE SULFATE 50 MG/1
0.1 CAPSULE, EXTENDED RELEASE ORAL ONCE
Refills: 0 | Status: DISCONTINUED | OUTPATIENT
Start: 2021-08-09 | End: 2021-08-10

## 2021-08-09 RX ORDER — AMLODIPINE BESYLATE 2.5 MG/1
5 TABLET ORAL DAILY
Refills: 0 | Status: DISCONTINUED | OUTPATIENT
Start: 2021-08-09 | End: 2021-08-11

## 2021-08-09 RX ORDER — HEPARIN SODIUM 5000 [USP'U]/ML
5000 INJECTION INTRAVENOUS; SUBCUTANEOUS EVERY 8 HOURS
Refills: 0 | Status: DISCONTINUED | OUTPATIENT
Start: 2021-08-09 | End: 2021-08-11

## 2021-08-09 RX ORDER — OXYCODONE HYDROCHLORIDE 5 MG/1
5 TABLET ORAL EVERY 6 HOURS
Refills: 0 | Status: DISCONTINUED | OUTPATIENT
Start: 2021-08-09 | End: 2021-08-11

## 2021-08-09 RX ORDER — METOPROLOL TARTRATE 50 MG
50 TABLET ORAL DAILY
Refills: 0 | Status: DISCONTINUED | OUTPATIENT
Start: 2021-08-09 | End: 2021-08-11

## 2021-08-09 RX ORDER — TELMISARTAN 20 MG/1
1 TABLET ORAL
Qty: 0 | Refills: 0 | DISCHARGE

## 2021-08-09 RX ORDER — SODIUM CHLORIDE 9 MG/ML
1000 INJECTION, SOLUTION INTRAVENOUS
Refills: 0 | Status: DISCONTINUED | OUTPATIENT
Start: 2021-08-09 | End: 2021-08-10

## 2021-08-09 RX ORDER — OXYCODONE HYDROCHLORIDE 5 MG/1
2.5 TABLET ORAL EVERY 4 HOURS
Refills: 0 | Status: DISCONTINUED | OUTPATIENT
Start: 2021-08-09 | End: 2021-08-11

## 2021-08-09 RX ORDER — SODIUM CHLORIDE 9 MG/ML
3 INJECTION INTRAMUSCULAR; INTRAVENOUS; SUBCUTANEOUS EVERY 8 HOURS
Refills: 0 | Status: DISCONTINUED | OUTPATIENT
Start: 2021-08-09 | End: 2021-08-09

## 2021-08-09 RX ORDER — TOBRAMYCIN 0.3 %
1 DROPS OPHTHALMIC (EYE) EVERY 4 HOURS
Refills: 0 | Status: COMPLETED | OUTPATIENT
Start: 2021-08-09 | End: 2021-08-09

## 2021-08-09 RX ORDER — ACETAMINOPHEN 500 MG
1000 TABLET ORAL EVERY 6 HOURS
Refills: 0 | Status: DISCONTINUED | OUTPATIENT
Start: 2021-08-09 | End: 2021-08-10

## 2021-08-09 RX ORDER — TOBRAMYCIN 0.3 %
1 DROPS OPHTHALMIC (EYE) EVERY 6 HOURS
Refills: 0 | Status: DISCONTINUED | OUTPATIENT
Start: 2021-08-09 | End: 2021-08-11

## 2021-08-09 RX ORDER — GABAPENTIN 400 MG/1
600 CAPSULE ORAL ONCE
Refills: 0 | Status: COMPLETED | OUTPATIENT
Start: 2021-08-09 | End: 2021-08-09

## 2021-08-09 RX ADMIN — GABAPENTIN 600 MILLIGRAM(S): 400 CAPSULE ORAL at 06:33

## 2021-08-09 RX ADMIN — Medication 50 MILLIGRAM(S): at 22:54

## 2021-08-09 RX ADMIN — AMLODIPINE BESYLATE 5 MILLIGRAM(S): 2.5 TABLET ORAL at 22:55

## 2021-08-09 RX ADMIN — Medication 1 DROP(S): at 23:33

## 2021-08-09 RX ADMIN — ZOLPIDEM TARTRATE 5 MILLIGRAM(S): 10 TABLET ORAL at 23:17

## 2021-08-09 RX ADMIN — HEPARIN SODIUM 5000 UNIT(S): 5000 INJECTION INTRAVENOUS; SUBCUTANEOUS at 17:23

## 2021-08-09 RX ADMIN — HEPARIN SODIUM 5000 UNIT(S): 5000 INJECTION INTRAVENOUS; SUBCUTANEOUS at 22:53

## 2021-08-09 RX ADMIN — SODIUM CHLORIDE 40 MILLILITER(S): 9 INJECTION, SOLUTION INTRAVENOUS at 13:55

## 2021-08-09 RX ADMIN — Medication 1 DROP(S): at 22:54

## 2021-08-09 RX ADMIN — Medication 1 DROP(S): at 17:22

## 2021-08-09 NOTE — CHART NOTE - NSCHARTNOTEFT_GEN_A_CORE
POST-OPERATIVE NOTE    Subjective:  Patient is s/p laparoscopic assisted low anterior resection for diverticulitis. Recovering appropriately. VSS, pain well controlled. Denies chest pain, shortness of breath, dizziness, nausea, vomiting. Not yet passing gas or having bowel movements. Reports temporary relief of eye pain with eye drops, but has started tearing again.     Vital Signs Last 24 Hrs  T(C): 36.2 (09 Aug 2021 17:00), Max: 37 (09 Aug 2021 06:40)  T(F): 97.2 (09 Aug 2021 17:00), Max: 98.6 (09 Aug 2021 06:40)  HR: 86 (09 Aug 2021 18:00) (77 - 93)  BP: 138/63 (09 Aug 2021 18:00) (112/55 - 148/71)  BP(mean): 93 (09 Aug 2021 18:00) (78 - 102)  RR: 14 (09 Aug 2021 17:00) (14 - 20)  SpO2: 98% (09 Aug 2021 18:00) (95% - 99%)  I&O's Detail    09 Aug 2021 07:01  -  09 Aug 2021 19:13  --------------------------------------------------------  IN:    Lactated Ringers: 200 mL  Total IN: 200 mL    OUT:    Indwelling Catheter - Urethral (mL): 365 mL  Total OUT: 365 mL    Total NET: -165 mL        cefoTEtan  IVPB 2  amLODIPine   Tablet 5  cefoTEtan  IVPB 2  heparin   Injectable 5000  metoprolol succinate ER 50    PAST MEDICAL & SURGICAL HISTORY:  HTN (hypertension)    Diverticula of colon    Pedal edema  possibly from amlodipine    Right hip pain    Diverticulosis of intestine    Obesity    S/P cataract surgery  bilateral    S/P cholecystectomy    S/P vaginal hysterectomy          Physical Exam:  General: NAD, resting comfortably in bed  Pulmonary: Nonlabored breathing, no respiratory distress  Cardiovascular: NSR  Abdominal: soft, nontender, nondistended; dressings c/d/i   Extremities: WWP      LABS:            CAPILLARY BLOOD GLUCOSE      POCT Blood Glucose.: 144 mg/dL (09 Aug 2021 06:24)      Radiology and Additional Studies:    Assessment:  The patient is a 71y Female who is now several hours post-op from a laparoscopic assisted low anterior resection for diverticulitis.     Plan:  - Ophtho consulted, will f/u recs   - Cont. tobramycin solution   - Pain control as needed; s/p morphine spinal  - DVT ppx  - LR @ 40cc/hr  - OOB and ambulating as tolerated  - F/u AM labs      [facility]  #[id number] POST-OPERATIVE NOTE    Subjective:  Patient is s/p laparoscopic assisted low anterior resection for diverticulitis. Recovering appropriately. VSS, pain well controlled. Denies chest pain, shortness of breath, dizziness, nausea, vomiting. Not yet passing gas or having bowel movements. Reports temporary relief of eye pain with eye drops, but has started tearing again.     Vital Signs Last 24 Hrs  T(C): 36.2 (09 Aug 2021 17:00), Max: 37 (09 Aug 2021 06:40)  T(F): 97.2 (09 Aug 2021 17:00), Max: 98.6 (09 Aug 2021 06:40)  HR: 86 (09 Aug 2021 18:00) (77 - 93)  BP: 138/63 (09 Aug 2021 18:00) (112/55 - 148/71)  BP(mean): 93 (09 Aug 2021 18:00) (78 - 102)  RR: 14 (09 Aug 2021 17:00) (14 - 20)  SpO2: 98% (09 Aug 2021 18:00) (95% - 99%)  I&O's Detail    09 Aug 2021 07:01  -  09 Aug 2021 19:13  --------------------------------------------------------  IN:    Lactated Ringers: 200 mL  Total IN: 200 mL    OUT:    Indwelling Catheter - Urethral (mL): 365 mL  Total OUT: 365 mL    Total NET: -165 mL        cefoTEtan  IVPB 2  amLODIPine   Tablet 5  cefoTEtan  IVPB 2  heparin   Injectable 5000  metoprolol succinate ER 50    PAST MEDICAL & SURGICAL HISTORY:  HTN (hypertension)    Diverticula of colon    Pedal edema  possibly from amlodipine    Right hip pain    Diverticulosis of intestine    Obesity    S/P cataract surgery  bilateral    S/P cholecystectomy    S/P vaginal hysterectomy          Physical Exam:  General: NAD, resting comfortably in bed; bilateral conjunctivitis with moderate tearing  Pulmonary: Nonlabored breathing, no respiratory distress  Cardiovascular: NSR  Abdominal: soft, nontender, nondistended; dressings c/d/i   Extremities: WWP      LABS:            CAPILLARY BLOOD GLUCOSE      POCT Blood Glucose.: 144 mg/dL (09 Aug 2021 06:24)      Radiology and Additional Studies:    Assessment:  The patient is a 71y Female who is now several hours post-op from a laparoscopic assisted low anterior resection for diverticulitis.     Plan:  - Ophtho consulted, will f/u recs   - Cont. tobramycin solution   - Pain control as needed; s/p morphine spinal  - DVT ppx  - LR @ 40cc/hr  - OOB and ambulating as tolerated  - F/u AM labs      [facility]  #[id number]

## 2021-08-09 NOTE — BRIEF OPERATIVE NOTE - OPERATION/FINDINGS
Lap hand assisted LAR with end to side anastomosis using EEA stapler at rectosigmoid junction. Right ucath removed at end of case.

## 2021-08-09 NOTE — CHART NOTE - NSCHARTNOTEFT_GEN_A_CORE
Patient seen and examined at bedside in PACU.   patient complaining about not being able to open her eyes due to pain. She denies vision loss or sense of anything in either of her eyes. She endorsed tearing b/l as well as some blurry vision.   She endorses she had cataract surgery however denies any other medical problems pertaining to her eyes, including glaucoma    Upon inspection there was no visible foreign body in her eyes b/l, with some redness in eyes L>R  Patients eyes were irrigated with saline with some relief, and she was now able to open her eyes; however she still endorsed pain in her eyes and eyelids. Still endorsed some blurry vision. Still denied sensation of anything in her eye.    Patient given Tetracaine 1 drop in each eye with relief of eye pain, however some blurry vision persisted.     Patient administered Tobramycin 1 drop in each eye.   Team informed and instructed to call for opthalmology consult.

## 2021-08-09 NOTE — BRIEF OPERATIVE NOTE - NSICDXBRIEFPROCEDURE_GEN_ALL_CORE_FT
PROCEDURES:  Laparoscopic assisted low anterior resection 09-Aug-2021 14:14:44  Familia Singletary

## 2021-08-09 NOTE — CONSULT NOTE ADULT - SUBJECTIVE AND OBJECTIVE BOX
[facility] DEPARTMENT OF OPHTHALMOLOGY - INITIAL ADULT CONSULT  -----------------------------------------------------------------------------------------------------------------  Eldon Root, PGY-2  [phone]  -----------------------------------------------------------------------------------------------------------------    HPI:  71F with history of HTN , diverticulosis , had incomplete colonoscopy due to angulation had CT in 2020- revealed thickening of distal descending sigmoid colon most likely due to diverticulosis, had CT 4/2021- Showed wall of proximal, sigmoid and descending with marked thickening and narrowing, colonoscopy unable to be performed because of narrowing and partial obstruction . Now coming in for ERP, Laparoscopic anterior resection, Possible open on 8/9/2021.     Denies Recent travel, Exposure or Covid symptoms  Covid vaccine-last dose 1/30/2021 (26 Jul 2021 13:53)    Interval History: Pt woke up from the surgery today with severe pain OU with inability to open eyes. The pain was associated with blurry vision and watery discharge. She denies any flashes, floaters or curtains. Eyes were taped during surgery.     PAST MEDICAL & SURGICAL HISTORY:  HTN (hypertension)  Diverticula of colon  Pedal edema  possibly from amlodipine  Right hip pain  Diverticulosis of intestine  Obesity  S/P cataract surgery  bilateral  S/P cholecystectomy  S/P vaginal hysterectomy    Past Ocular History: CEIOL OU 3 y ago.  Ophthalmic Medications: none  FAMILY HISTORY: no ocular fhx  Social History: denies smoking, drinks socially.    MEDICATIONS  (STANDING):  acetaminophen   Tablet .. 1000 milliGRAM(s) Oral every 6 hours  amLODIPine   Tablet 5 milliGRAM(s) Oral daily  cefoTEtan  IVPB 2 Gram(s) IV Intermittent once  celecoxib 400 milliGRAM(s) Oral once  chlorhexidine 2% Cloths 1 Application(s) Topical once  erythromycin   Ointment 1 Application(s) Both EYES at bedtime  heparin   Injectable 5000 Unit(s) SubCutaneous every 8 hours  ibuprofen  Tablet. 400 milliGRAM(s) Oral every 6 hours  lactated ringers. 1000 milliLiter(s) (40 mL/Hr) IV Continuous <Continuous>  lidocaine 1% Injectable 0.2 milliLiter(s) Local Injection once  metoprolol succinate ER 50 milliGRAM(s) Oral daily  morphine PF Spinal 0.1 milliGRAM(s) IntraThecal. once  tobramycin 0.3% Ophthalmic Solution 1 Drop(s) Both EYES every 6 hours    MEDICATIONS  (PRN):  naloxone Injectable 0.1 milliGRAM(s) IV Push every 3 minutes PRN For ANY of the following changes in patient status:  A. RR LESS THAN 10 breaths per minute, B. Oxygen saturation LESS THAN 90%, C. Sedation score of 6  ondansetron Injectable 4 milliGRAM(s) IV Push every 6 hours PRN Nausea  oxyCODONE    IR 5 milliGRAM(s) Oral every 6 hours PRN Severe Pain (7 - 10)  oxyCODONE    IR 2.5 milliGRAM(s) Oral every 4 hours PRN Moderate Pain (4 - 6)  zolpidem 5 milliGRAM(s) Oral at bedtime PRN Insomnia    Allergies & Intolerances: none    Review of Systems:  Constitutional: No fever, chills  Eyes: + blurry vision OU, no flashes, floaters, FBS, no erythema, discharge, double vision OU  Neuro: No tremors  Cardiovascular: No chest pain, palpitations  Respiratory: No SOB, no cough  GI: No nausea, vomiting, abdominal pain  : No dysuria  Skin: no rash  Psych: no depression  Endocrine: no polyuria, polydipsia  Heme/lymph: no swelling    VITALS: T(C): 37.7 (08-09-21 @ 22:30)  T(F): 99.8 (08-09-21 @ 22:30), Max: 100 (08-09-21 @ 20:25)  HR: 87 (08-09-21 @ 22:30) (77 - 93)  BP: 126/75 (08-09-21 @ 22:30) (112/55 - 148/71)  RR:  (14 - 20)  SpO2:  (95% - 99%)  General: AAO x 3, appropriate mood and affect    Ophthalmology Exam:  Visual acuity (sc): OD 20/150, OS 20/40  Pupils: PERRL OU, no APD  Ttono: 10 OU  Extraocular movements (EOMs): Full OU, no pain, no diplopia  Confrontational Visual Field (CVF): Full OU  Color Plates: 12/12 OU    Pen Light Exam (PLE)  External: Flat OU  Lids/Lashes/Lacrimal Ducts: wnl OU    Sclera/Conjunctiva: W+Q OU, no injection OU  Cornea: OD linear horizontal corneal abrasion measuring approx. 8 x 1mm, OS linear horizontal corneal abrasion measuring approx. 4 x 0.7mm  Anterior Chamber: D+F OU    Iris: Flat OU  Lens: PCIOL OU    Fundus Exam: dilated with 1% tropicamide and 2.5% phenylephrine  Approval obtained from primary team for dilation  Patient aware that pupils can remained dilated for at least 4-6 hours  Exam performed with 20D lens    Vitreous: wnl OU  Disc, cup/disc: sharp and pink, 0.4 OU PPA OD  Macula: wnl OU  Vessels: wnl OU  Periphery: wnl OU    Labs/Imaging:  none

## 2021-08-09 NOTE — PRE-OP CHECKLIST - HOW ADMINISTERED
“Patient's name, , procedure and correct site were confirmed during the [place] Timeout.” Self Administrated

## 2021-08-09 NOTE — CONSULT NOTE ADULT - ASSESSMENT
#PRELIM NOTE#    # Post surgical linear corneal abrasion OD >OS  -Start tobramycin QID both eyes  -Start erythromycin qhs both eyes    *full note to come*    SDW Dr. Martines (chief resident) 71F with history of HTN , diverticulosis, CEIOL OU, s/p colectomy today. Ophthalmology consulted for bilateral eye pain and blurry vision upon waking up from the surgery. On exam VA 20/150, 20/40, PERRL no APD, IOP wnl, CVF, color vision and EOM full. Bilateral linear horizontal corneal abrasions OD 8 x 1mm, OS 4 x 0.7mm, AC D+Q OU. PCIOL OU, DFE wnl OU.     # Corneal abrasions OD >OS, likely 2/2 exposure during surgery  -Pt was started on tobramycin by PACU team  -C/w tobramycin QID both eyes for 7 days  -Start erythromycin qhs both eyes for 7 days  -Follow up at [facility] at [address] [facility] within a week, our team will contact the patient to schedule.    LEXIE Martines (chief resident)

## 2021-08-10 ENCOUNTER — TRANSCRIPTION ENCOUNTER (OUTPATIENT)
Age: 71
End: 2021-08-10

## 2021-08-10 LAB
ANION GAP SERPL CALC-SCNC: 10 MMOL/L — SIGNIFICANT CHANGE UP (ref 5–17)
BUN SERPL-MCNC: 9 MG/DL — SIGNIFICANT CHANGE UP (ref 7–23)
CALCIUM SERPL-MCNC: 8.7 MG/DL — SIGNIFICANT CHANGE UP (ref 8.4–10.5)
CHLORIDE SERPL-SCNC: 104 MMOL/L — SIGNIFICANT CHANGE UP (ref 96–108)
CO2 SERPL-SCNC: 24 MMOL/L — SIGNIFICANT CHANGE UP (ref 22–31)
CREAT SERPL-MCNC: 0.65 MG/DL — SIGNIFICANT CHANGE UP (ref 0.5–1.3)
GLUCOSE SERPL-MCNC: 128 MG/DL — HIGH (ref 70–99)
HCT VFR BLD CALC: 32.4 % — LOW (ref 34.5–45)
HGB BLD-MCNC: 10.2 G/DL — LOW (ref 11.5–15.5)
MAGNESIUM SERPL-MCNC: 2 MG/DL — SIGNIFICANT CHANGE UP (ref 1.6–2.6)
MCHC RBC-ENTMCNC: 28.6 PG — SIGNIFICANT CHANGE UP (ref 27–34)
MCHC RBC-ENTMCNC: 31.5 GM/DL — LOW (ref 32–36)
MCV RBC AUTO: 90.8 FL — SIGNIFICANT CHANGE UP (ref 80–100)
NRBC # BLD: 0 /100 WBCS — SIGNIFICANT CHANGE UP (ref 0–0)
PHOSPHATE SERPL-MCNC: 3.4 MG/DL — SIGNIFICANT CHANGE UP (ref 2.5–4.5)
PLATELET # BLD AUTO: 246 K/UL — SIGNIFICANT CHANGE UP (ref 150–400)
POTASSIUM SERPL-MCNC: 4 MMOL/L — SIGNIFICANT CHANGE UP (ref 3.5–5.3)
POTASSIUM SERPL-SCNC: 4 MMOL/L — SIGNIFICANT CHANGE UP (ref 3.5–5.3)
RBC # BLD: 3.57 M/UL — LOW (ref 3.8–5.2)
RBC # FLD: 13.1 % — SIGNIFICANT CHANGE UP (ref 10.3–14.5)
SODIUM SERPL-SCNC: 138 MMOL/L — SIGNIFICANT CHANGE UP (ref 135–145)
WBC # BLD: 10.75 K/UL — HIGH (ref 3.8–10.5)
WBC # FLD AUTO: 10.75 K/UL — HIGH (ref 3.8–10.5)

## 2021-08-10 RX ORDER — IBUPROFEN 200 MG
200 TABLET ORAL EVERY 6 HOURS
Refills: 0 | Status: DISCONTINUED | OUTPATIENT
Start: 2021-08-10 | End: 2021-08-11

## 2021-08-10 RX ORDER — ACETAMINOPHEN 500 MG
750 TABLET ORAL EVERY 6 HOURS
Refills: 0 | Status: DISCONTINUED | OUTPATIENT
Start: 2021-08-10 | End: 2021-08-10

## 2021-08-10 RX ORDER — ACETAMINOPHEN 500 MG
1000 TABLET ORAL EVERY 6 HOURS
Refills: 0 | Status: COMPLETED | OUTPATIENT
Start: 2021-08-10 | End: 2021-08-11

## 2021-08-10 RX ORDER — MAGNESIUM OXIDE 400 MG ORAL TABLET 241.3 MG
1000 TABLET ORAL
Refills: 0 | Status: DISCONTINUED | OUTPATIENT
Start: 2021-08-10 | End: 2021-08-11

## 2021-08-10 RX ADMIN — HEPARIN SODIUM 5000 UNIT(S): 5000 INJECTION INTRAVENOUS; SUBCUTANEOUS at 05:15

## 2021-08-10 RX ADMIN — Medication 200 MILLIGRAM(S): at 15:23

## 2021-08-10 RX ADMIN — Medication 1 DROP(S): at 05:15

## 2021-08-10 RX ADMIN — Medication 200 MILLIGRAM(S): at 09:00

## 2021-08-10 RX ADMIN — Medication 200 MILLIGRAM(S): at 20:38

## 2021-08-10 RX ADMIN — MAGNESIUM OXIDE 400 MG ORAL TABLET 1000 MILLIGRAM(S): 241.3 TABLET ORAL at 09:21

## 2021-08-10 RX ADMIN — Medication 200 MILLIGRAM(S): at 15:00

## 2021-08-10 RX ADMIN — OXYCODONE HYDROCHLORIDE 2.5 MILLIGRAM(S): 5 TABLET ORAL at 12:30

## 2021-08-10 RX ADMIN — HEPARIN SODIUM 5000 UNIT(S): 5000 INJECTION INTRAVENOUS; SUBCUTANEOUS at 17:07

## 2021-08-10 RX ADMIN — OXYCODONE HYDROCHLORIDE 2.5 MILLIGRAM(S): 5 TABLET ORAL at 17:05

## 2021-08-10 RX ADMIN — Medication 1 DROP(S): at 17:12

## 2021-08-10 RX ADMIN — OXYCODONE HYDROCHLORIDE 2.5 MILLIGRAM(S): 5 TABLET ORAL at 11:37

## 2021-08-10 RX ADMIN — OXYCODONE HYDROCHLORIDE 2.5 MILLIGRAM(S): 5 TABLET ORAL at 17:00

## 2021-08-10 RX ADMIN — Medication 1 DROP(S): at 11:37

## 2021-08-10 RX ADMIN — Medication 200 MILLIGRAM(S): at 21:10

## 2021-08-10 RX ADMIN — Medication 400 MILLIGRAM(S): at 09:21

## 2021-08-10 RX ADMIN — Medication 50 MILLIGRAM(S): at 05:14

## 2021-08-10 RX ADMIN — Medication 1 APPLICATION(S): at 22:08

## 2021-08-10 RX ADMIN — AMLODIPINE BESYLATE 5 MILLIGRAM(S): 2.5 TABLET ORAL at 05:14

## 2021-08-10 RX ADMIN — ZOLPIDEM TARTRATE 5 MILLIGRAM(S): 10 TABLET ORAL at 22:08

## 2021-08-10 NOTE — ANESTHESIA FOLLOW-UP NOTE - NSEVALATIONFT_GEN_ALL_CORE
Bilateral corneal abrasions. started on Tobramycin yesterday while in PACU. Seen by Ophthalmology. Optho appreciated.

## 2021-08-10 NOTE — DISCHARGE NOTE PROVIDER - NSDCCPCAREPLAN_GEN_ALL_CORE_FT
PRINCIPAL DISCHARGE DIAGNOSIS  Diagnosis: Diverticulosis  Assessment and Plan of Treatment:       SECONDARY DISCHARGE DIAGNOSES  Diagnosis: Abrasion, corneal  Assessment and Plan of Treatment:

## 2021-08-10 NOTE — DISCHARGE NOTE PROVIDER - NSDCACTIVITY_GEN_ALL_CORE
Driving allowed/No heavy lifting/straining Showering allowed/Stairs allowed/Driving allowed/Walking - Indoors allowed/No heavy lifting/straining/Walking - Outdoors allowed

## 2021-08-10 NOTE — DIETITIAN INITIAL EVALUATION ADULT. - CHIEF COMPLAINT
This is a "71F with history of HTN , diverticulosis , had incomplete colonoscopy due to angulation had CT in 2020- revealed thickening of distal descending sigmoid colon most likely due to diverticulosis, had CT 4/2021- Showed wall of proximal, sigmoid and descending with marked thickening and narrowing, colonoscopy unable to be performed because of narrowing and partial obstruction; now s/p laparoscopic assisted low anterior resection (8/9)"

## 2021-08-10 NOTE — PROGRESS NOTE ADULT - ASSESSMENT
71F with history of HTN , diverticulosis , had incomplete colonoscopy due to angulation had CT in 2020- revealed thickening of distal descending sigmoid colon most likely due to diverticulosis, had CT 4/2021- Showed wall of proximal, sigmoid and descending with marked thickening and narrowing, colonoscopy unable to be performed because of narrowing and partial obstruction; now s/p laparoscopic assisted low anterior resection (8/9), recovering appropriately on floors.     Plan:  - Ophtho consulted for corneal abrasion: c/w tobramycin QID both eyes for 7 days, start erythromycin qhs both eyes for 7 days  - Pain control as needed; s/p morphine spinal  - DVT ppx  - LR @ 40cc/hr  - OOB and ambulating as tolerated  - F/u AM labs      [facility]  #[id number]. 71F with history of HTN , diverticulosis , had incomplete colonoscopy due to angulation had CT in 2020- revealed thickening of distal descending sigmoid colon most likely due to diverticulosis, had CT 4/2021- Showed wall of proximal, sigmoid and descending with marked thickening and narrowing, colonoscopy unable to be performed because of narrowing and partial obstruction; now s/p laparoscopic assisted low anterior resection (8/9), recovering appropriately on floors.     Plan:  - Ophtho consulted for corneal abrasion: c/w tobramycin QID both eyes for 7 days, start erythromycin qhs both eyes for 7 days  - Pain control as needed   - DVT ppx  - Diet: LRD  - OOB and ambulating as tolerated  - F/u AM labs  - PT recs: outpatient PT w/ rolling walker      [facility]  #[id number].

## 2021-08-10 NOTE — DISCHARGE NOTE PROVIDER - CARE PROVIDER_API CALL
Bo Retana)  ColonRectal Surgery; Surgery  [address], [address]  [place] [address]  Phone: [phone]  Fax: [phone]  Follow Up Time: 2 weeks

## 2021-08-10 NOTE — DISCHARGE NOTE PROVIDER - NSDCFUADDAPPT_GEN_ALL_CORE_FT
Please make an appointment and follow up outpatient with Dr. Retana in 1 week  Please make an appointment and follow up with your Primary Care Physician in 1-2 weeks

## 2021-08-10 NOTE — DIETITIAN INITIAL EVALUATION ADULT. - PHYSCIAL ASSESSMENT
skin: free of pressure injuries per nursing flow sheets, noted with surgical incision well nourished/obese

## 2021-08-10 NOTE — PROGRESS NOTE ADULT - SUBJECTIVE AND OBJECTIVE BOX
Day 1 of Anesthesia Pain Management Service    SUBJECTIVE: Starting to feel some pain  Pain Scale Score:          [X] Refer to charted pain scores    THERAPY:    s/p    100 mcg PF morphine on 8\9\2021      MEDICATIONS  (STANDING):  acetaminophen  IVPB .. 1000 milliGRAM(s) IV Intermittent every 6 hours  amLODIPine   Tablet 5 milliGRAM(s) Oral daily  cefoTEtan  IVPB 2 Gram(s) IV Intermittent once  chlorhexidine 2% Cloths 1 Application(s) Topical once  erythromycin   Ointment 1 Application(s) Both EYES at bedtime  heparin   Injectable 5000 Unit(s) SubCutaneous every 8 hours  ibuprofen  Tablet. 200 milliGRAM(s) Oral every 6 hours  lidocaine 1% Injectable 0.2 milliLiter(s) Local Injection once  magnesium oxide 1000 milliGRAM(s) Oral two times a day with meals  metoprolol succinate ER 50 milliGRAM(s) Oral daily  morphine PF Spinal 0.1 milliGRAM(s) IntraThecal. once  tobramycin 0.3% Ophthalmic Solution 1 Drop(s) Both EYES every 6 hours    MEDICATIONS  (PRN):  naloxone Injectable 0.1 milliGRAM(s) IV Push every 3 minutes PRN For ANY of the following changes in patient status:  A. RR LESS THAN 10 breaths per minute, B. Oxygen saturation LESS THAN 90%, C. Sedation score of 6  ondansetron Injectable 4 milliGRAM(s) IV Push every 6 hours PRN Nausea  oxyCODONE    IR 5 milliGRAM(s) Oral every 6 hours PRN Severe Pain (7 - 10)  oxyCODONE    IR 2.5 milliGRAM(s) Oral every 4 hours PRN Moderate Pain (4 - 6)  zolpidem 5 milliGRAM(s) Oral at bedtime PRN Insomnia      OBJECTIVE:    Sedation:        	[X] Alert	 [ ] Drowsy	[ ] Arousable      [ ] Asleep       [ ] Unresponsive    Side Effects:	[X] None 	[ ] Nausea	[ ] Vomiting         [ ] Pruritus  		[ ] Weakness            [ ] Numbness	          [ ] Other:    Vital Signs Last 24 Hrs  T(C): 36.5 (10 Aug 2021 09:46), Max: 37.8 (09 Aug 2021 20:25)  T(F): 97.7 (10 Aug 2021 09:46), Max: 100 (09 Aug 2021 20:25)  HR: 65 (10 Aug 2021 09:46) (65 - 93)  BP: 116/72 (10 Aug 2021 09:46) (112/55 - 148/71)  BP(mean): 93 (09 Aug 2021 19:00) (78 - 102)  RR: 18 (10 Aug 2021 09:46) (14 - 20)  SpO2: 96% (10 Aug 2021 09:46) (95% - 99%)    ASSESSMENT/ PLAN: Endorsing some incisional pain.  Educated to pain management ooptions  [X] Patient transitioned to prn analgesics  [X] Pain management per primary service, pain service to sign off   [X]Documentation and Verification of current medications

## 2021-08-10 NOTE — PROGRESS NOTE ADULT - SUBJECTIVE AND OBJECTIVE BOX
SURGERY  Pager: #[phone]    STATUS POST: laparoscopic assisted low anterior resection for diverticulitis    POST OPERATIVE DAY #1    INTERVAL EVENTS/SUBJECTIVE: No acute events overnight. Patient started on tobramycin and erythromycin per ophtho recs.     ______________________________________________  OBJECTIVE:   T(C): 37.3 (08-10-21 @ 00:42), Max: 37.8 (08-09-21 @ 20:25)  HR: 77 (08-10-21 @ 00:42) (77 - 93)  BP: 118/72 (08-10-21 @ 00:42) (112/55 - 148/71)  RR: 18 (08-10-21 @ 00:42) (14 - 20)  SpO2: 97% (08-10-21 @ 00:42) (95% - 99%)  Wt(kg): --  CAPILLARY BLOOD GLUCOSE      POCT Blood Glucose.: 144 mg/dL (09 Aug 2021 06:24)    I&O's Detail    09 Aug 2021 07:01  -  10 Aug 2021 00:58  --------------------------------------------------------  IN:    Lactated Ringers: 200 mL  Total IN: 200 mL    OUT:    Indwelling Catheter - Urethral (mL): 1015 mL  Total OUT: 1015 mL    Total NET: -815 mL          Physical exam:  Gen: resting in bed comfortably in NAD  Chest: no increased WOB, regular inspiratory effort   Abdomen: Soft, nontender, nondistended with no rebound tenderness or guarding. Incisions clean, dry, intact, with no erythema.   Vascular: WWP, HERNÁNDEZ x4  NEURO: awake, alert  ______________________________________________  LABS:          _____________________________________________  RADIOLOGY:     SURGERY  Pager: #[phone]    STATUS POST: laparoscopic assisted low anterior resection for diverticulitis    POST OPERATIVE DAY #1    INTERVAL EVENTS/SUBJECTIVE: No acute events overnight. Patient started on tobramycin and erythromycin per ophtho recs. Otherwise no complaints and pain controlled.     ______________________________________________  OBJECTIVE:   T(C): 37.3 (08-10-21 @ 00:42), Max: 37.8 (08-09-21 @ 20:25)  HR: 77 (08-10-21 @ 00:42) (77 - 93)  BP: 118/72 (08-10-21 @ 00:42) (112/55 - 148/71)  RR: 18 (08-10-21 @ 00:42) (14 - 20)  SpO2: 97% (08-10-21 @ 00:42) (95% - 99%)  Wt(kg): --  CAPILLARY BLOOD GLUCOSE      POCT Blood Glucose.: 144 mg/dL (09 Aug 2021 06:24)    I&O's Detail    09 Aug 2021 07:01  -  10 Aug 2021 00:58  --------------------------------------------------------  IN:    Lactated Ringers: 200 mL  Total IN: 200 mL    OUT:    Indwelling Catheter - Urethral (mL): 1015 mL  Total OUT: 1015 mL    Total NET: -815 mL          Physical exam:  Gen: resting in bed comfortably in NAD  HEENT: EOMI, PERRLA, non-injected sclera  Chest: no increased WOB, regular inspiratory effort   Abdomen: Soft, nontender, nondistended with no rebound tenderness or guarding. Incisions clean, dry, intact, with no erythema. Penrose in place  : u cath and lizarraga removed ToV in progress  Vascular: WWP, HERNÁNDEZ x4  NEURO: awake, alert  ______________________________________________  LABS:          _____________________________________________  RADIOLOGY:

## 2021-08-10 NOTE — PHYSICAL THERAPY INITIAL EVALUATION ADULT - GAIT TRAINING, PT EVAL
GOAL: Pt will ambulate at least 350' independently with good balance and appropriate pacing without AD in 3-4wks.

## 2021-08-10 NOTE — DIETITIAN INITIAL EVALUATION ADULT. - ADD RECOMMEND
1) Medical team to advance diet when medically feasible. Consider advancing to low fiber diet as tolerated. 2) Pending diet advancement recommend Ensure Surgery BID. 3) Diet education provided, reinforce as needed. 4) RD to remain available and follow-up as medically appropriate.

## 2021-08-10 NOTE — DISCHARGE NOTE PROVIDER - HOSPITAL COURSE
71 year old male with history of HTN , diverticulosis , had incomplete colonoscopy due to angulation had CT in 2020- revealed thickening of distal descending sigmoid colon most likely due to diverticulosis, had CT 4/2021- Showed wall of proximal, sigmoid and descending with marked thickening and narrowing, colonoscopy unable to be performed because of narrowing and partial obstruction. Now coming in for ERP, Laparoscopic anterior resection, Possible open on 8/9/2021. Denies Recent travel, Exposure or Covid symptoms.  Covid vaccine-last dose 1/30/2021    On 8/9 patient underwent lap hand assisted LAR with end to side anastomosis using EEA stapler at rectosigmoid junction. Right ucath removed at end of case. The patient tolerated the procedure well without complications, was extubated, and transferred to the PACU in stable condition. The patient tolerated the procedure well without complications, was extubated, and transferred to the PACU in stable condition. Post operatively patient seen by optho for corneal abrasions OD >OS, likely 2/2 exposure during surgery. Pt was started on tobramycin by PACU team they recommended to C/w tobramycin QID both eyes for 7 days. Diet was advanced as tolerated. ERAS protocol was followed. On day of discharge, the patient was tolerating diet, ambulating well and pain controlled. Physical therapy evaluated the patient and recommended home with outpatient PT.

## 2021-08-10 NOTE — PHYSICAL THERAPY INITIAL EVALUATION ADULT - GENERAL OBSERVATIONS, REHAB EVAL
Pt received semi-supine in bed in NAD, +cont pulse ox, +supplemental O2 via NC, +IVF (all disconnected for session by NAYA Bustamante), VSS, agreeable to participate in therapy at this time

## 2021-08-10 NOTE — DIETITIAN INITIAL EVALUATION ADULT. - OTHER INFO
Weight: pt reports UBW as 168-170lbs, denies any recent changes which is consistent with weight per [facility] HIE of 168lbs (5/7/21). Current dosing weight is 171lbs.       Pt currently on clear liquids diet, consuming breakfast. Encourage intake of protein supplements. No nausea, emesis noted so far. Denies BM. Pt amendable to diet education. Provided low-fiber nutrition therapy including importance of avoiding  fiber rich foods, fresh fruits/vegetables, whole grains, and added fiber in processed foods. Discussed chewing foods well and adequate hydration and protein intake. Discussed gradual reintroduction of fiber back into diet once cleared by MD. Pt verbalized understanding and accepted written handout. Patient with no nutrition-related questions at this time. Made aware RD remains available as needed.

## 2021-08-10 NOTE — DIETITIAN INITIAL EVALUATION ADULT. - ORAL INTAKE PTA/DIET HISTORY
pt reports good PO intake and appetite, consumes regular diet with no restrictions, NKFA. Pt denies chewing/swallowing difficulty, nausea, vomiting, diarrhea, constipation. Denies micronutrient supplementation.

## 2021-08-10 NOTE — PHYSICAL THERAPY INITIAL EVALUATION ADULT - PRECAUTIONS/LIMITATIONS, REHAB EVAL
Now presents POD#1 s/p ERP, Laparoscopic anterior resection for diverticulitis admitted to [address] [place] [address] for further management and monitoring.

## 2021-08-10 NOTE — DISCHARGE NOTE PROVIDER - NSDCFUADDINST_GEN_ALL_CORE_FT
Please take Tylenol every 6 hours, and stagger with ibuprofen every 6 hours. This will allow you to alternate medications for more consistent pain control. For example: take a dose of Tylenol at 8 am, then take a dose of ibuprofen at 11 am, then take a dose of Tylenol at 2pm, and continue as needed.   Do not exceed 4,000mg of Tylenol in 24 hours.    [facility]. Please only take for severe pain    Eye drops were sent to [facility]

## 2021-08-10 NOTE — DISCHARGE NOTE PROVIDER - NSDCMRMEDTOKEN_GEN_ALL_CORE_FT
amLODIPine 5 mg oral tablet: 1 tab(s) orally once a day (at bedtime)  hydroCHLOROthiazide 12.5 mg oral capsule: 1 cap(s) orally once a day, As Needed.pedal edema   metoprolol succinate 50 mg oral tablet, extended release: 1 tab(s) orally once a day  telmisartan 80 mg oral tablet: 1 tab(s) orally once a day   amLODIPine 5 mg oral tablet: 1 tab(s) orally once a day (at bedtime)  erythromycin 0.5% ophthalmic ointment: 1 application to each affected eye once a day (at bedtime)  hydroCHLOROthiazide 12.5 mg oral capsule: 1 cap(s) orally once a day, As Needed.pedal edema   ibuprofen 200 mg oral tablet: 1 tab(s) orally every 6 hours  metoprolol succinate 50 mg oral tablet, extended release: 1 tab(s) orally once a day  oxyCODONE 5 mg oral tablet: 1 tab(s) orally every 6 hours, As needed, Severe Pain (7 - 10) MDD:4 tabs  Physical Therapy 2-3x/week: Diagnosis: K63.9, K57.32, 85261  telmisartan 80 mg oral tablet: 1 tab(s) orally once a day  tobramycin 0.3% ophthalmic solution: 1 drop(s) to each affected eye every 6 hours   acetaminophen 500 mg oral tablet: 2 tab(s) orally once  amLODIPine 5 mg oral tablet: 1 tab(s) orally once a day (at bedtime)  erythromycin 0.5% ophthalmic ointment: 1 application to each affected eye once a day (at bedtime)  hydroCHLOROthiazide 12.5 mg oral capsule: 1 cap(s) orally once a day, As Needed.pedal edema   ibuprofen 200 mg oral tablet: 1 tab(s) orally every 6 hours  metoprolol succinate 50 mg oral tablet, extended release: 1 tab(s) orally once a day  oxyCODONE 5 mg oral tablet: 1 tab(s) orally every 6 hours, As needed, Severe Pain (7 - 10) MDD:4 tabs  Physical Therapy 2-3x/week: Diagnosis: K63.9, K57.32, 43280  telmisartan 80 mg oral tablet: 1 tab(s) orally once a day  tobramycin 0.3% ophthalmic solution: 1 drop(s) to each affected eye every 6 hours

## 2021-08-10 NOTE — DISCHARGE NOTE PROVIDER - NSDCCPTREATMENT_GEN_ALL_CORE_FT
PRINCIPAL PROCEDURE  Procedure: Laparoscopic assisted low anterior resection  Findings and Treatment: WOUND CARE: gauze and tape to wound  BATHING: Please do not submerge wound underwater. You may shower and/or sponge bathe.  ACTIVITY: No heavy lifting anything more than 10-15lbs or straining. Otherwise, you may return to your usual level of physical activity. If you are taking narcotic pain medication (such as Percocet), do NOT drive a car, operate machinery or make important decisions.  DIET: Low fiber diet  NOTIFY YOUR SURGEON IF: You have any bleeding that does not stop, any pus draining from your wound, any fever (over 100.4 F) or chills, persistent nausea/vomiting with inability to tolerate food or liquids, persistent diarrhea, or if your pain is not controlled on your discharge pain medications.  FOLLOW-UP:  1. Please call to make a follow-up appointment within one week of discharge   2. Please follow up with your primary care physician in one week regarding your hospitalization.'         PRINCIPAL PROCEDURE  Procedure: Laparoscopic assisted low anterior resection  Findings and Treatment: WOUND CARE: gauze and tape to wound  BATHING: Please do not submerge wound underwater. You may shower and/or sponge bathe. Allow soapy water to run over incision site. Do not rub incision site. Pat dry when out of shower.  ACTIVITY: No heavy lifting anything more than 10-15lbs or straining. Otherwise, you may return to your usual level of physical activity. If you are taking narcotic pain medication (such as Percocet), do NOT drive a car, operate machinery or make important decisions.  DIET: Low fiber diet  NOTIFY YOUR SURGEON IF: You have any bleeding that does not stop, any pus draining from your wound, any fever (over 100.4 F) or chills, persistent nausea/vomiting with inability to tolerate food or liquids, persistent diarrhea, or if your pain is not controlled on your discharge pain medications.  FOLLOW-UP:  1. Please call to make a follow-up appointment with Dr. Retana within one week of discharge   2. Please follow up with your primary care physician in one week regarding your hospitalization.'

## 2021-08-10 NOTE — DIETITIAN INITIAL EVALUATION ADULT. - CALCULATED TO (G/KG)
CHIEF COMPLAINT: Presents for a non-dilated retina check with macular OCT both eyes and possible Avastin injection right eye    HISTORY OF PRESENT ILLNESS: agree with tech note and denies pain, redness or discharge. Teresita feels her vision is doing fine, does note a little trouble reading smaller print over the last few days.     OCT (optical coherence tomography) macula analysis. The macula shows hard drusen with resolved cystoid edema and resolved thickening (209 microns) in Right eye and shows hard drusen without subretinal fluid (227 microns) in the Left eye.      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 No injection needed today.  Continue to monitor amsler grid and report changes immediately and continue with current AREDS/AREDS 2 vitamins. 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    RETURN FOR A DILATED RETINA CHECK WITH MACULAR OCT BOTH EYES AND POSSIBLE AVASTIN  RIGHT EYE IN 1 MONTH AS SCHEDULED or immediately as needed as instructed for pain, redness, or decreased vision.    I, Melissa Ball, COA, OSC, attest that I performed the duties of a scribe for this encounter, in the presence of Ras Rodgers MD who personally saw and examined the patient.    The documentation recorded by the scribe accurately and completely reflects the service(s) I personally performed and the decisions made by me.                  66.64

## 2021-08-10 NOTE — PHYSICAL THERAPY INITIAL EVALUATION ADULT - PERTINENT HX OF CURRENT PROBLEM, REHAB EVAL
71 year old male with history of HTN, diverticulosis, had incomplete colonoscopy due to angulation had CT in 2020- revealed thickening of distal descending sigmoid colon most likely due to diverticulosis, had CT 4/2021- Showed wall of proximal, sigmoid and descending with marked thickening and narrowing, colonoscopy unable to be performed because of narrowing and partial obstruction. CONTINUED: 71 year old F with history of HTN, diverticulosis, had incomplete colonoscopy due to angulation had CT in 2020- revealed thickening of distal descending sigmoid colon most likely due to diverticulosis, had CT 4/2021- Showed wall of proximal, sigmoid and descending with marked thickening and narrowing, colonoscopy unable to be performed because of narrowing and partial obstruction. CONTINUED:

## 2021-08-10 NOTE — PHYSICAL THERAPY INITIAL EVALUATION ADULT - DIAGNOSIS, PT EVAL
Pt presents with postop pain, impairments with strength, balance and endurance impacting ability to perform ADLs and functional mobility

## 2021-08-10 NOTE — PHYSICAL THERAPY INITIAL EVALUATION ADULT - LIVES WITH, PROFILE
Pt resides alone in apartment with no stairs to enter, +elevator inside. Prior to admit, pt reports being functionally independent in all aspects of functional mobility and self care without DME/AD. Pt reports her son will be able to stay with her upon return home to get her settled./alone

## 2021-08-10 NOTE — DISCHARGE NOTE PROVIDER - INSTRUCTIONS
Low Fiber Diet: Avoid raw fruits and vegetables especially ones with thick skin and salad. Thoroughly cooked vegetables that are soft and easily mashed with a fork are okay to eat. Bananas are also ok to eat.

## 2021-08-11 ENCOUNTER — TRANSCRIPTION ENCOUNTER (OUTPATIENT)
Age: 71
End: 2021-08-11

## 2021-08-11 VITALS
HEART RATE: 66 BPM | RESPIRATION RATE: 18 BRPM | WEIGHT: 171.3 LBS | OXYGEN SATURATION: 93 % | SYSTOLIC BLOOD PRESSURE: 120 MMHG | TEMPERATURE: 98 F | DIASTOLIC BLOOD PRESSURE: 75 MMHG

## 2021-08-11 LAB
ANION GAP SERPL CALC-SCNC: 11 MMOL/L — SIGNIFICANT CHANGE UP (ref 5–17)
BUN SERPL-MCNC: 12 MG/DL — SIGNIFICANT CHANGE UP (ref 7–23)
CALCIUM SERPL-MCNC: 8.5 MG/DL — SIGNIFICANT CHANGE UP (ref 8.4–10.5)
CHLORIDE SERPL-SCNC: 103 MMOL/L — SIGNIFICANT CHANGE UP (ref 96–108)
CO2 SERPL-SCNC: 25 MMOL/L — SIGNIFICANT CHANGE UP (ref 22–31)
CREAT SERPL-MCNC: 0.72 MG/DL — SIGNIFICANT CHANGE UP (ref 0.5–1.3)
GLUCOSE SERPL-MCNC: 109 MG/DL — HIGH (ref 70–99)
HCT VFR BLD CALC: 31.1 % — LOW (ref 34.5–45)
HGB BLD-MCNC: 9.7 G/DL — LOW (ref 11.5–15.5)
MAGNESIUM SERPL-MCNC: 2 MG/DL — SIGNIFICANT CHANGE UP (ref 1.6–2.6)
MCHC RBC-ENTMCNC: 28.9 PG — SIGNIFICANT CHANGE UP (ref 27–34)
MCHC RBC-ENTMCNC: 31.2 GM/DL — LOW (ref 32–36)
MCV RBC AUTO: 92.6 FL — SIGNIFICANT CHANGE UP (ref 80–100)
NRBC # BLD: 0 /100 WBCS — SIGNIFICANT CHANGE UP (ref 0–0)
PHOSPHATE SERPL-MCNC: 2.8 MG/DL — SIGNIFICANT CHANGE UP (ref 2.5–4.5)
PLATELET # BLD AUTO: 228 K/UL — SIGNIFICANT CHANGE UP (ref 150–400)
POTASSIUM SERPL-MCNC: 4.1 MMOL/L — SIGNIFICANT CHANGE UP (ref 3.5–5.3)
POTASSIUM SERPL-SCNC: 4.1 MMOL/L — SIGNIFICANT CHANGE UP (ref 3.5–5.3)
RBC # BLD: 3.36 M/UL — LOW (ref 3.8–5.2)
RBC # FLD: 13.2 % — SIGNIFICANT CHANGE UP (ref 10.3–14.5)
SODIUM SERPL-SCNC: 139 MMOL/L — SIGNIFICANT CHANGE UP (ref 135–145)
WBC # BLD: 8.94 K/UL — SIGNIFICANT CHANGE UP (ref 3.8–10.5)
WBC # FLD AUTO: 8.94 K/UL — SIGNIFICANT CHANGE UP (ref 3.8–10.5)

## 2021-08-11 PROCEDURE — 88305 TISSUE EXAM BY PATHOLOGIST: CPT

## 2021-08-11 PROCEDURE — 88331 PATH CONSLTJ SURG 1 BLK 1SPC: CPT

## 2021-08-11 PROCEDURE — 82962 GLUCOSE BLOOD TEST: CPT

## 2021-08-11 PROCEDURE — 88307 TISSUE EXAM BY PATHOLOGIST: CPT

## 2021-08-11 PROCEDURE — C9399: CPT

## 2021-08-11 PROCEDURE — 97116 GAIT TRAINING THERAPY: CPT

## 2021-08-11 PROCEDURE — 85027 COMPLETE CBC AUTOMATED: CPT

## 2021-08-11 PROCEDURE — 80048 BASIC METABOLIC PNL TOTAL CA: CPT

## 2021-08-11 PROCEDURE — 83735 ASSAY OF MAGNESIUM: CPT

## 2021-08-11 PROCEDURE — 97162 PT EVAL MOD COMPLEX 30 MIN: CPT

## 2021-08-11 PROCEDURE — 84100 ASSAY OF PHOSPHORUS: CPT

## 2021-08-11 PROCEDURE — 88304 TISSUE EXAM BY PATHOLOGIST: CPT

## 2021-08-11 PROCEDURE — C1758: CPT

## 2021-08-11 PROCEDURE — C1889: CPT

## 2021-08-11 PROCEDURE — 97530 THERAPEUTIC ACTIVITIES: CPT

## 2021-08-11 RX ORDER — ACETAMINOPHEN 500 MG
1000 TABLET ORAL EVERY 6 HOURS
Refills: 0 | Status: DISCONTINUED | OUTPATIENT
Start: 2021-08-11 | End: 2021-08-11

## 2021-08-11 RX ORDER — SODIUM,POTASSIUM PHOSPHATES 278-250MG
1 POWDER IN PACKET (EA) ORAL ONCE
Refills: 0 | Status: COMPLETED | OUTPATIENT
Start: 2021-08-11 | End: 2021-08-11

## 2021-08-11 RX ORDER — TOBRAMYCIN 0.3 %
1 DROPS OPHTHALMIC (EYE)
Refills: 0 | Status: DISCONTINUED | OUTPATIENT
Start: 2021-08-11 | End: 2021-08-11

## 2021-08-11 RX ORDER — ACETAMINOPHEN 500 MG
2 TABLET ORAL
Qty: 0 | Refills: 0 | DISCHARGE
Start: 2021-08-11

## 2021-08-11 RX ORDER — ERYTHROMYCIN BASE 5 MG/GRAM
1 OINTMENT (GRAM) OPHTHALMIC (EYE)
Qty: 3.5 | Refills: 0
Start: 2021-08-11 | End: 2021-08-17

## 2021-08-11 RX ORDER — ACETAMINOPHEN 500 MG
1000 TABLET ORAL ONCE
Refills: 0 | Status: COMPLETED | OUTPATIENT
Start: 2021-08-11 | End: 2021-08-11

## 2021-08-11 RX ORDER — OXYCODONE HYDROCHLORIDE 5 MG/1
1 TABLET ORAL
Qty: 12 | Refills: 0
Start: 2021-08-11 | End: 2021-08-13

## 2021-08-11 RX ORDER — TOBRAMYCIN 0.3 %
1 DROPS OPHTHALMIC (EYE)
Qty: 5 | Refills: 0
Start: 2021-08-11 | End: 2021-08-17

## 2021-08-11 RX ORDER — IBUPROFEN 200 MG
1 TABLET ORAL
Qty: 0 | Refills: 0 | DISCHARGE
Start: 2021-08-11

## 2021-08-11 RX ADMIN — OXYCODONE HYDROCHLORIDE 5 MILLIGRAM(S): 5 TABLET ORAL at 04:45

## 2021-08-11 RX ADMIN — OXYCODONE HYDROCHLORIDE 2.5 MILLIGRAM(S): 5 TABLET ORAL at 10:25

## 2021-08-11 RX ADMIN — Medication 1000 MILLIGRAM(S): at 06:30

## 2021-08-11 RX ADMIN — Medication 400 MILLIGRAM(S): at 00:21

## 2021-08-11 RX ADMIN — Medication 1000 MILLIGRAM(S): at 13:22

## 2021-08-11 RX ADMIN — OXYCODONE HYDROCHLORIDE 5 MILLIGRAM(S): 5 TABLET ORAL at 04:13

## 2021-08-11 RX ADMIN — Medication 200 MILLIGRAM(S): at 10:19

## 2021-08-11 RX ADMIN — Medication 1 DROP(S): at 00:22

## 2021-08-11 RX ADMIN — HEPARIN SODIUM 5000 UNIT(S): 5000 INJECTION INTRAVENOUS; SUBCUTANEOUS at 10:20

## 2021-08-11 RX ADMIN — Medication 400 MILLIGRAM(S): at 05:59

## 2021-08-11 RX ADMIN — Medication 1000 MILLIGRAM(S): at 01:00

## 2021-08-11 RX ADMIN — Medication 1 DROP(S): at 05:57

## 2021-08-11 RX ADMIN — AMLODIPINE BESYLATE 5 MILLIGRAM(S): 2.5 TABLET ORAL at 05:57

## 2021-08-11 RX ADMIN — Medication 1 PACKET(S): at 12:18

## 2021-08-11 RX ADMIN — HEPARIN SODIUM 5000 UNIT(S): 5000 INJECTION INTRAVENOUS; SUBCUTANEOUS at 00:21

## 2021-08-11 RX ADMIN — Medication 50 MILLIGRAM(S): at 06:32

## 2021-08-11 NOTE — PROGRESS NOTE ADULT - ATTENDING COMMENTS
Tolerating diet with bowel function.  Eye symptoms improving.  Discharge home
Patient with corneal abrasions.  Cont as per optho.  Cont ERP

## 2021-08-11 NOTE — DISCHARGE NOTE NURSING/CASE MANAGEMENT/SOCIAL WORK - NSDCPNINST_GEN_ALL_CORE
call MD // go to ER:  temperature, nausea, vomiting; check sites daily for redness, warmth, bleeding, drainage with foul odor

## 2021-08-11 NOTE — PROGRESS NOTE ADULT - ASSESSMENT
71F with history of HTN , diverticulosis , had incomplete colonoscopy due to angulation had CT in 2020- revealed thickening of distal descending sigmoid colon most likely due to diverticulosis, had CT 4/2021- Showed wall of proximal, sigmoid and descending with marked thickening and narrowing, colonoscopy unable to be performed because of narrowing and partial obstruction; now s/p laparoscopic assisted low anterior resection (8/9), recovering appropriately on floors.     Plan:  - Ophtho consulted for corneal abrasion: c/w tobramycin QID both eyes for 7 days, start erythromycin qhs both eyes for 7 days  - Pain control as needed   - DVT ppx  - Diet: LRD  - OOB and ambulating as tolerated  - F/u AM labs  - PT recs: outpatient PT w/ rolling walker      [facility]  #[id number]. 71F with history of HTN , diverticulosis , had incomplete colonoscopy due to angulation had CT in 2020- revealed thickening of distal descending sigmoid colon most likely due to diverticulosis, had CT 4/2021- Showed wall of proximal, sigmoid and descending with marked thickening and narrowing, colonoscopy unable to be performed because of narrowing and partial obstruction; now s/p laparoscopic assisted low anterior resection (8/9), recovering appropriately on floors.     Plan:  - DC mag ox  - Ophtho consulted for corneal abrasion: c/w tobramycin QID both eyes for 7 days, start erythromycin qhs both eyes for 7 days  - Pain control as needed   - DVT ppx  - Diet: LRD  - OOB and ambulating as tolerated  - F/u AM labs  - PT recs: outpatient PT w/ rolling walker      [facility]  #[id number].

## 2021-08-11 NOTE — DISCHARGE NOTE NURSING/CASE MANAGEMENT/SOCIAL WORK - PATIENT PORTAL LINK FT
You can access the FollowMyHealth Patient Portal offered by [facility] by registering at the following website: [email or web address]. By joining [facility] FollowMyHealth portal, you will also be able to view your health information using other applications (apps) compatible with our system.

## 2021-08-11 NOTE — PROGRESS NOTE ADULT - SUBJECTIVE AND OBJECTIVE BOX
[facility] DEPARTMENT OF OPHTHALMOLOGY  ------------------------------------------------------------------------------  Ayaz Garduno MD PGY 3  Pager: [phone]  ------------------------------------------------------------------------------    HPI: 71F with history of HTN , diverticulosis presents for for ERP on 8/9/2021. Ophthalmology woke up from the surgery with severe pain both eyes with inability to open eyes. The pain was associated with blurry vision and watery discharge. She denies any flashes, floaters or curtains. Eyes were taped during surgery.     Interval History: No acute events overnight. Today patient reports eye pain has resolved. Reports persistent mild blurry vision at distance and near.    MEDICATIONS  (STANDING):  amLODIPine   Tablet 5 milliGRAM(s) Oral daily  chlorhexidine 2% Cloths 1 Application(s) Topical once  erythromycin   Ointment 1 Application(s) Both EYES at bedtime  heparin   Injectable 5000 Unit(s) SubCutaneous every 8 hours  ibuprofen  Tablet. 200 milliGRAM(s) Oral every 6 hours  lidocaine 1% Injectable 0.2 milliLiter(s) Local Injection once  metoprolol succinate ER 50 milliGRAM(s) Oral daily  potassium phosphate / sodium phosphate Powder (PHOS-NaK) 1 Packet(s) Oral once  tobramycin 0.3% Ophthalmic Solution 1 Drop(s) Both EYES every 6 hours    MEDICATIONS  (PRN):  oxyCODONE    IR 5 milliGRAM(s) Oral every 6 hours PRN Severe Pain (7 - 10)  oxyCODONE    IR 2.5 milliGRAM(s) Oral every 4 hours PRN Moderate Pain (4 - 6)  zolpidem 5 milliGRAM(s) Oral at bedtime PRN Insomnia      VITALS: T(C): 36.7 (08-11-21 @ 09:32)  T(F): 98 (08-11-21 @ 09:32), Max: 98.6 (08-10-21 @ 20:53)  HR: 66 (08-11-21 @ 09:32) (62 - 72)  BP: 116/73 (08-11-21 @ 09:32) (104/68 - 134/74)  RR:  (18 - 18)  SpO2:  (92% - 97%)  Wt(kg): --    Ophthalmology Exam:  Visual acuity (cc): 20/25 OD, 20/20 OS  Pupils: PERRL OU, no APD.  Intraocular Pressure: 10, 11  Extraocular movements (EOMs): Full OU, no pain, no diplopia  Confrontational Visual Field (CVF): Full OU    Pen Light Exam (PLE)  External: Flat OU.  Lids/Lashes/Lacrimal Ducts: Flat OU.   Sclera/Conjunctiva: White and quiet OU.  Cornea: Decreased TBUT OU. No epithelial defect.  Anterior Chamber: Deep and formed OU.    Iris: Flat OU.  Lens: PCIOL OU.    Assessment and Plan  71F with history of HTN , diverticulosis, CEIOL both eyes, s/p colectomy today. Ophthalmology consulted for bilateral eye pain and blurry vision upon waking up from the surgery and found to have corneal abrasions both eyes    1. Postoperative corneal abrasion both eyes (right > left), resolved  -Pt reports eye pain resolved. Still reports blurry vision both eyes at distance and near, which has been persistent.  -Pt was started on tobramycin by PACU team. Recommend to discontinue tobramycin.   -Continue erythromycin ointment qhs both eyes for 5 more days.  -Recommend preservative-free artificial tears qid both eyes.   -Pt states she will follow-up with her own ophthalmologist within 1 week of discharge.  -Pt to be discharged today.    Ayaz Garduno MD, PGY-3  Pager: [phone]  Also available on Microsoft Teams      [facility] DEPARTMENT OF OPHTHALMOLOGY  ------------------------------------------------------------------------------  Ayaz Garduno MD PGY 3  Pager: [phone]  ------------------------------------------------------------------------------    HPI: 71F with history of HTN , diverticulosis presents for for ERP on 8/9/2021. Ophthalmology woke up from the surgery with severe pain both eyes with inability to open eyes. The pain was associated with blurry vision and watery discharge. She denies any flashes, floaters or curtains. Eyes were taped during surgery.     Interval History: No acute events overnight. Today patient reports eye pain has resolved. Reports persistent mild blurry vision at distance and near.    MEDICATIONS  (STANDING):  amLODIPine   Tablet 5 milliGRAM(s) Oral daily  chlorhexidine 2% Cloths 1 Application(s) Topical once  erythromycin   Ointment 1 Application(s) Both EYES at bedtime  heparin   Injectable 5000 Unit(s) SubCutaneous every 8 hours  ibuprofen  Tablet. 200 milliGRAM(s) Oral every 6 hours  lidocaine 1% Injectable 0.2 milliLiter(s) Local Injection once  metoprolol succinate ER 50 milliGRAM(s) Oral daily  potassium phosphate / sodium phosphate Powder (PHOS-NaK) 1 Packet(s) Oral once  tobramycin 0.3% Ophthalmic Solution 1 Drop(s) Both EYES every 6 hours    MEDICATIONS  (PRN):  oxyCODONE    IR 5 milliGRAM(s) Oral every 6 hours PRN Severe Pain (7 - 10)  oxyCODONE    IR 2.5 milliGRAM(s) Oral every 4 hours PRN Moderate Pain (4 - 6)  zolpidem 5 milliGRAM(s) Oral at bedtime PRN Insomnia      VITALS: T(C): 36.7 (08-11-21 @ 09:32)  T(F): 98 (08-11-21 @ 09:32), Max: 98.6 (08-10-21 @ 20:53)  HR: 66 (08-11-21 @ 09:32) (62 - 72)  BP: 116/73 (08-11-21 @ 09:32) (104/68 - 134/74)  RR:  (18 - 18)  SpO2:  (92% - 97%)  Wt(kg): --    Ophthalmology Exam:  Visual acuity (cc): 20/25 OD, 20/20 OS  Pupils: PERRL OU, no APD.  Intraocular Pressure: 10, 11  Extraocular movements (EOMs): Full OU, no pain, no diplopia  Confrontational Visual Field (CVF): Full OU    Pen Light Exam (PLE)  External: Flat OU.  Lids/Lashes/Lacrimal Ducts: Flat OU.   Sclera/Conjunctiva: White and quiet OU.  Cornea: Decreased TBUT OU. No epithelial defect.  Anterior Chamber: Deep and formed OU.    Iris: Flat OU.  Lens: PCIOL OU.    Assessment and Plan  71F with history of HTN , diverticulosis, CEIOL both eyes, s/p colectomy today. Ophthalmology consulted for bilateral eye pain and blurry vision upon waking up from the surgery and found to have corneal abrasions both eyes    1. Postoperative corneal abrasion both eyes (right > left), resolved  -Pt reports eye pain resolved. Still reports blurry vision both eyes at distance and near, which has been persistent.  -Pt was started on tobramycin by PACU team. Recommend to continue tobramycin qid both eyes for 3 more days.   -Continue erythromycin ointment qhs both eyes for 5 more days.  -Recommend preservative-free artificial tears qid both eyes.   -Pt states she will follow-up with her own ophthalmologist within 1 week of discharge.  -Pt to be discharged today.    Ayaz Garduno MD, PGY-3  Pager: [phone]  Also available on Microsoft Teams      [facility] DEPARTMENT OF OPHTHALMOLOGY  ------------------------------------------------------------------------------  Ayaz Garduno MD PGY 3  Pager: [phone]  ------------------------------------------------------------------------------    HPI: 71F with history of HTN , diverticulosis presents for for ERP on 8/9/2021. Ophthalmology woke up from the surgery with severe pain both eyes with inability to open eyes. The pain was associated with blurry vision and watery discharge. She denies any flashes, floaters or curtains. Eyes were taped during surgery.     Interval History: No acute events overnight. Today patient reports eye pain has resolved. Reports persistent mild blurry vision at distance and near.  Follow up corneal abrasion OU.    MEDICATIONS  (STANDING):  amLODIPine   Tablet 5 milliGRAM(s) Oral daily  chlorhexidine 2% Cloths 1 Application(s) Topical once  erythromycin   Ointment 1 Application(s) Both EYES at bedtime  heparin   Injectable 5000 Unit(s) SubCutaneous every 8 hours  ibuprofen  Tablet. 200 milliGRAM(s) Oral every 6 hours  lidocaine 1% Injectable 0.2 milliLiter(s) Local Injection once  metoprolol succinate ER 50 milliGRAM(s) Oral daily  potassium phosphate / sodium phosphate Powder (PHOS-NaK) 1 Packet(s) Oral once  tobramycin 0.3% Ophthalmic Solution 1 Drop(s) Both EYES every 6 hours    MEDICATIONS  (PRN):  oxyCODONE    IR 5 milliGRAM(s) Oral every 6 hours PRN Severe Pain (7 - 10)  oxyCODONE    IR 2.5 milliGRAM(s) Oral every 4 hours PRN Moderate Pain (4 - 6)  zolpidem 5 milliGRAM(s) Oral at bedtime PRN Insomnia      VITALS: T(C): 36.7 (08-11-21 @ 09:32)  T(F): 98 (08-11-21 @ 09:32), Max: 98.6 (08-10-21 @ 20:53)  HR: 66 (08-11-21 @ 09:32) (62 - 72)  BP: 116/73 (08-11-21 @ 09:32) (104/68 - 134/74)  RR:  (18 - 18)  SpO2:  (92% - 97%)  Wt(kg): --    Ophthalmology Exam:  Visual acuity (cc): 20/25 OD, 20/20 OS  Pupils: PERRL OU, no APD.  Intraocular Pressure: 10, 11  Extraocular movements (EOMs): Full OU, no pain, no diplopia  Confrontational Visual Field (CVF): Full OU    Pen Light Exam (PLE)  External: Flat OU.  Lids/Lashes/Lacrimal Ducts: Flat OU.   Sclera/Conjunctiva: White and quiet OU.  Cornea: Decreased TBUT OU. No epithelial defect.  Anterior Chamber: Deep and formed OU.    Iris: Flat OU.  Lens: PCIOL OU.    Assessment and Plan  71F with history of HTN , diverticulosis, CEIOL both eyes, s/p colectomy today. Ophthalmology consulted for bilateral eye pain and blurry vision upon waking up from the surgery and found to have corneal abrasions both eyes    1. Postoperative corneal abrasion both eyes (right > left), resolved  -Pt reports eye pain resolved. Still reports blurry vision both eyes at distance and near, which has been persistent.  -Pt was started on tobramycin by PACU team. Recommend to continue tobramycin qid both eyes for 3 more days.   -Continue erythromycin ointment qhs both eyes for 5 more days.  -Recommend preservative-free artificial tears qid both eyes.   -Pt states she will follow-up with her own ophthalmologist within 1 week of discharge.  -Pt to be discharged today.    Ayaz Garduno MD, PGY-3  Pager: [phone]  Also available on [employer] Teams     I have discussed the patient with the resident and reviewed the residents note including the history, exam, assessment, and plan.  I agree with the residents assessment and plan.    Ema Reeves MD

## 2021-08-11 NOTE — PROGRESS NOTE ADULT - SUBJECTIVE AND OBJECTIVE BOX
SURGERY  Pager: #[phone]    STATUS POST: laparoscopic assisted low anterior resection for diverticulitis    POST OPERATIVE DAY #2      INTERVAL EVENTS/SUBJECTIVE: No acute events overnight.     ______________________________________________  OBJECTIVE:   T(C): 36.9 (08-11-21 @ 01:46), Max: 37.3 (08-10-21 @ 05:05)  HR: 71 (08-11-21 @ 01:46) (62 - 72)  BP: 104/68 (08-11-21 @ 01:46) (104/68 - 127/81)  RR: 18 (08-11-21 @ 01:46) (18 - 18)  SpO2: 92% (08-11-21 @ 01:46) (92% - 97%)  Wt(kg): --  CAPILLARY BLOOD GLUCOSE        I&O's Detail    09 Aug 2021 07:01  -  10 Aug 2021 07:00  --------------------------------------------------------  IN:    Lactated Ringers: 680 mL  Total IN: 680 mL    OUT:    Indwelling Catheter - Urethral (mL): 1915 mL  Total OUT: 1915 mL    Total NET: -1235 mL      10 Aug 2021 07:01  -  11 Aug 2021 02:11  --------------------------------------------------------  IN:    Oral Fluid: 850 mL  Total IN: 850 mL    OUT:    Indwelling Catheter - Urethral (mL): 400 mL    Voided (mL): 250 mL  Total OUT: 650 mL    Total NET: 200 mL          Physical exam:  Gen: resting in bed comfortably in NAD  Chest: no increased WOB, regular inspiratory effort   Abdomen: Soft, nontender, nondistended with no rebound tenderness or guarding. Incisions clean, dry, intact, with no erythema.   Vascular: WWP, HERNÁNDEZ x4  NEURO: awake, alert  ______________________________________________  LABS:  CBC Full  -  ( 10 Aug 2021 07:15 )  WBC Count : 10.75 K/uL  RBC Count : 3.57 M/uL  Hemoglobin : 10.2 g/dL  Hematocrit : 32.4 %  Platelet Count - Automated : 246 K/uL  Mean Cell Volume : 90.8 fl  Mean Cell Hemoglobin : 28.6 pg  Mean Cell Hemoglobin Concentration : 31.5 gm/dL  Auto Neutrophil # : x  Auto Lymphocyte # : x  Auto Monocyte # : x  Auto Eosinophil # : x  Auto Basophil # : x  Auto Neutrophil % : x  Auto Lymphocyte % : x  Auto Monocyte % : x  Auto Eosinophil % : x  Auto Basophil % : x    08-10    138  |  104  |  9   ----------------------------<  128<H>  4.0   |  24  |  0.65    Ca    8.7      10 Aug 2021 07:14  Phos  3.4     08-10  Mg     2.0     08-10      _____________________________________________  RADIOLOGY:     SURGERY  Pager: #[phone]    STATUS POST: laparoscopic assisted low anterior resection for diverticulitis    POST OPERATIVE DAY #2      INTERVAL EVENTS/SUBJECTIVE: No acute events overnight. Eye pain improved but still blurry.  Passing gas and loose stool. Urinating freely.     ______________________________________________  OBJECTIVE:   T(C): 36.9 (08-11-21 @ 01:46), Max: 37.3 (08-10-21 @ 05:05)  HR: 71 (08-11-21 @ 01:46) (62 - 72)  BP: 104/68 (08-11-21 @ 01:46) (104/68 - 127/81)  RR: 18 (08-11-21 @ 01:46) (18 - 18)  SpO2: 92% (08-11-21 @ 01:46) (92% - 97%)  Wt(kg): --  CAPILLARY BLOOD GLUCOSE        I&O's Detail    09 Aug 2021 07:01  -  10 Aug 2021 07:00  --------------------------------------------------------  IN:    Lactated Ringers: 680 mL  Total IN: 680 mL    OUT:    Indwelling Catheter - Urethral (mL): 1915 mL  Total OUT: 1915 mL    Total NET: -1235 mL      10 Aug 2021 07:01  -  11 Aug 2021 02:11  --------------------------------------------------------  IN:    Oral Fluid: 850 mL  Total IN: 850 mL    OUT:    Indwelling Catheter - Urethral (mL): 400 mL    Voided (mL): 250 mL  Total OUT: 650 mL    Total NET: 200 mL          Physical exam:  Gen: resting in bed comfortably in NAD  Chest: no increased WOB, regular inspiratory effort   Abdomen: Soft, nontender, nondistended with no rebound tenderness or guarding. Incisions clean, dry, intact, with no erythema. Penrose in place.   Vascular: WWP, HERNÁNDEZ x4  NEURO: awake, alert  ______________________________________________  LABS:  CBC Full  -  ( 10 Aug 2021 07:15 )  WBC Count : 10.75 K/uL  RBC Count : 3.57 M/uL  Hemoglobin : 10.2 g/dL  Hematocrit : 32.4 %  Platelet Count - Automated : 246 K/uL  Mean Cell Volume : 90.8 fl  Mean Cell Hemoglobin : 28.6 pg  Mean Cell Hemoglobin Concentration : 31.5 gm/dL  Auto Neutrophil # : x  Auto Lymphocyte # : x  Auto Monocyte # : x  Auto Eosinophil # : x  Auto Basophil # : x  Auto Neutrophil % : x  Auto Lymphocyte % : x  Auto Monocyte % : x  Auto Eosinophil % : x  Auto Basophil % : x    08-10    138  |  104  |  9   ----------------------------<  128<H>  4.0   |  24  |  0.65    Ca    8.7      10 Aug 2021 07:14  Phos  3.4     08-10  Mg     2.0     08-10      _____________________________________________  RADIOLOGY:

## 2021-08-13 ENCOUNTER — TRANSCRIPTION ENCOUNTER (OUTPATIENT)
Age: 71
End: 2021-08-13

## 2021-08-15 LAB — SURGICAL PATHOLOGY STUDY: SIGNIFICANT CHANGE UP

## 2021-08-16 ENCOUNTER — NON-APPOINTMENT (OUTPATIENT)
Age: 71
End: 2021-08-16

## 2021-08-19 ENCOUNTER — APPOINTMENT (OUTPATIENT)
Dept: SURGERY | Facility: CLINIC | Age: 71
End: 2021-08-19
Payer: COMMERCIAL

## 2021-08-19 VITALS
OXYGEN SATURATION: 98 % | TEMPERATURE: 97.8 F | SYSTOLIC BLOOD PRESSURE: 170 MMHG | HEIGHT: 61 IN | DIASTOLIC BLOOD PRESSURE: 70 MMHG | BODY MASS INDEX: 31.34 KG/M2 | HEART RATE: 60 BPM | RESPIRATION RATE: 16 BRPM | WEIGHT: 166 LBS

## 2021-08-19 PROCEDURE — 99024 POSTOP FOLLOW-UP VISIT: CPT

## 2021-08-19 RX ORDER — AMLODIPINE BESYLATE 5 MG/1
5 TABLET ORAL DAILY
Qty: 30 | Refills: 0 | Status: DISCONTINUED | COMMUNITY
Start: 2020-04-30 | End: 2021-08-19

## 2021-08-19 RX ORDER — NEOMYCIN SULFATE 500 MG/1
500 TABLET ORAL
Qty: 3 | Refills: 0 | Status: DISCONTINUED | COMMUNITY
Start: 2021-06-23 | End: 2021-08-19

## 2021-08-19 RX ORDER — HYDROCHLOROTHIAZIDE 12.5 MG/1
TABLET ORAL
Refills: 0 | Status: ACTIVE | COMMUNITY

## 2021-08-19 RX ORDER — METRONIDAZOLE 250 MG/1
250 TABLET ORAL
Qty: 3 | Refills: 0 | Status: DISCONTINUED | COMMUNITY
Start: 2021-06-23 | End: 2021-08-19

## 2021-08-19 RX ORDER — TOBRAMYCIN AND DEXAMETHASONE 3; 1 MG/ML; MG/ML
0.3-0.1 SUSPENSION/ DROPS OPHTHALMIC
Refills: 0 | Status: ACTIVE | COMMUNITY

## 2021-08-19 RX ORDER — SODIUM PICOSULFATE, MAGNESIUM OXIDE, AND ANHYDROUS CITRIC ACID 10; 3.5; 12 MG/160ML; G/160ML; G/160ML
10-3.5-12 MG-GM LIQUID ORAL
Qty: 1 | Refills: 0 | Status: DISCONTINUED | COMMUNITY
Start: 2021-05-07 | End: 2021-08-19

## 2021-08-19 NOTE — ASSESSMENT
[FreeTextEntry1] : Patient doing well.  She still has blurry vision from her corneal abrasions.  She will advance her diet and follow-up with ophthalmology.  Follow-up with me in 1 month.

## 2021-08-19 NOTE — PHYSICAL EXAM
[Abdomen Masses] : No abdominal masses [Abdomen Tenderness] : ~T No ~M abdominal tenderness [de-identified] : Normal wound healing

## 2021-08-19 NOTE — HISTORY OF PRESENT ILLNESS
[FreeTextEntry1] : Garry is a 71 year old female here for post op visit s/p laparoscopic assisted low anterior resection 8/9/21 due to diverticulitis. Pathology; 1.mesenteric lymph node, excision- one reactive lymph node. 2.omentum, partial omentectomy- adipose tissue consistent with omentum. 3.colon, descending and sigmoid, resection- segment of colon with diverticulosis, resection margins are viable, five reactive lymph nodes. 4.colon, proximal and distal donuts, excision- portions of colon with no significant diagnostic alterations. \par \par Colonoscopy 6/7/21- hemorrhoids found on perianal exam. diverticulosis in the sigmoid colon. one 5 mm polyp in the sigmoid colon. Pathology; sigmoid colon polyp, polypectomy- colonic mucosa with hyperplastic change. \par \par Today pt reports pain 4/10. Pt reports taking Oxycodone for 3 days post surgery. currently taking Advil as needed for pain. Pt reports 1 formed BM daily, no pain, no bleeding. No episodes of incontinence. Pt reports tolerating low fiber diet, small appetite, no nausea, no vomiting. Not on anticoagulants.

## 2021-08-24 PROBLEM — Z01.810 PREOPERATIVE CARDIOVASCULAR EXAMINATION: Status: RESOLVED | Noted: 2021-07-26 | Resolved: 2021-08-24

## 2021-08-24 PROBLEM — Z01.818 PREOP TESTING: Status: RESOLVED | Noted: 2021-06-05 | Resolved: 2021-08-24

## 2021-08-25 ENCOUNTER — APPOINTMENT (OUTPATIENT)
Dept: SURGERY | Facility: CLINIC | Age: 71
End: 2021-08-25
Payer: COMMERCIAL

## 2021-08-25 VITALS
TEMPERATURE: 98 F | RESPIRATION RATE: 17 BRPM | HEART RATE: 65 BPM | OXYGEN SATURATION: 99 % | SYSTOLIC BLOOD PRESSURE: 138 MMHG | DIASTOLIC BLOOD PRESSURE: 69 MMHG

## 2021-08-25 DIAGNOSIS — Z01.810 ENCOUNTER FOR PREPROCEDURAL CARDIOVASCULAR EXAMINATION: ICD-10-CM

## 2021-08-25 DIAGNOSIS — Z01.818 ENCOUNTER FOR OTHER PREPROCEDURAL EXAMINATION: ICD-10-CM

## 2021-08-25 DIAGNOSIS — L03.90 CELLULITIS, UNSPECIFIED: ICD-10-CM

## 2021-08-25 PROCEDURE — 99024 POSTOP FOLLOW-UP VISIT: CPT

## 2021-08-25 NOTE — PHYSICAL EXAM
[FreeTextEntry1] : 1 cm circumferential erythema at supraumbilical portion of wound.  Wound probed and no drainage.

## 2021-08-25 NOTE — ASSESSMENT
[FreeTextEntry1] : Continue Augmentin.  Bactroban prescribed.  Antibiotics to be discontinued as soon as cellulitis resolves completely.  Follow-up 2 weeks

## 2021-08-25 NOTE — HISTORY OF PRESENT ILLNESS
[FreeTextEntry1] : Garry is a 71 year old female here for post op visit s/p laparoscopic assisted low anterior resection 8/9/21 due to diverticulitis. Pathology; 1.mesenteric lymph node, excision- one reactive lymph node. 2.omentum, partial omentectomy- adipose tissue consistent with omentum. 3.colon, descending and sigmoid, resection- segment of colon with diverticulosis, resection margins are viable, five reactive lymph nodes. 4.colon, proximal and distal donuts, excision- portions of colon with no significant diagnostic alterations. She was last seen 8/19, healing well.\par \par Yesterday she reported a red area on her incision. A picture was emailed and she was place on Augmentin and probiotic as she could not get to the office.\par \par Today pt reports RLQ pain 6/10, LLQ burning sensation. Pt reports 1-2 soft BMs daily, no pain, no bleeding. No episodes of incontinence. Pt reports good appetite, nausea from antibiotics, no vomiting. Not on anticoagulants. \par

## 2021-09-08 ENCOUNTER — APPOINTMENT (OUTPATIENT)
Dept: SURGERY | Facility: CLINIC | Age: 71
End: 2021-09-08
Payer: COMMERCIAL

## 2021-09-08 VITALS
TEMPERATURE: 97.6 F | SYSTOLIC BLOOD PRESSURE: 163 MMHG | BODY MASS INDEX: 31.34 KG/M2 | OXYGEN SATURATION: 98 % | HEART RATE: 68 BPM | DIASTOLIC BLOOD PRESSURE: 67 MMHG | HEIGHT: 61 IN | WEIGHT: 166 LBS | RESPIRATION RATE: 15 BRPM

## 2021-09-08 DIAGNOSIS — Z09 ENCOUNTER FOR FOLLOW-UP EXAMINATION AFTER COMPLETED TREATMENT FOR CONDITIONS OTHER THAN MALIGNANT NEOPLASM: ICD-10-CM

## 2021-09-08 PROCEDURE — 99024 POSTOP FOLLOW-UP VISIT: CPT

## 2021-09-08 RX ORDER — AMOXICILLIN AND CLAVULANATE POTASSIUM 875; 125 MG/1; MG/1
875-125 TABLET, COATED ORAL
Qty: 20 | Refills: 1 | Status: DISCONTINUED | COMMUNITY
Start: 2021-08-24 | End: 2021-09-08

## 2021-09-08 RX ORDER — MUPIROCIN 20 MG/G
2 OINTMENT TOPICAL 3 TIMES DAILY
Qty: 1 | Refills: 0 | Status: DISCONTINUED | COMMUNITY
Start: 2021-08-25 | End: 2021-09-08

## 2021-09-08 RX ORDER — NAPROXEN 500 MG/1
500 TABLET ORAL
Qty: 60 | Refills: 2 | Status: DISCONTINUED | COMMUNITY
Start: 2021-05-11 | End: 2021-09-08

## 2021-09-08 NOTE — ASSESSMENT
[FreeTextEntry1] : Patient doing well.  Follow-up 2 months.  Endoscopic colonoscopy in 6 to 12 months.

## 2021-09-08 NOTE — HISTORY OF PRESENT ILLNESS
[FreeTextEntry1] : Garry is a 71 year old female here for post op visit s/p laparoscopic assisted low anterior resection 8/9/21 due to diverticulitis. Pathology; 1.mesenteric lymph node, excision- one reactive lymph node. 2.omentum, partial omentectomy- adipose tissue consistent with omentum. 3.colon, descending and sigmoid, resection- segment of colon with diverticulosis, resection margins are viable, five reactive lymph nodes. 4.colon, proximal and distal donuts, excision- portions of colon with no significant diagnostic alterations.\par \par Last seen 8/25/21- Pt advised to continue Augmentin and Bactroban.\par Pt states cellulitis has improved since her last office visit. She has completed course of abx. She continues to apply Bactoban to the area TID. Tolerating regular diet without issue. Typically has two formed BMs daily. She denies abdominal pain.

## 2021-09-08 NOTE — PHYSICAL EXAM
[Abdomen Masses] : No abdominal masses [Abdomen Tenderness] : ~T No ~M abdominal tenderness [de-identified] : Normal wound healing.  Some retraction of umbilical wound.

## 2021-11-03 ENCOUNTER — APPOINTMENT (OUTPATIENT)
Dept: SURGERY | Facility: CLINIC | Age: 71
End: 2021-11-03

## 2022-03-20 DIAGNOSIS — U07.1 COVID-19: ICD-10-CM

## 2022-03-20 DIAGNOSIS — R05.9 COUGH, UNSPECIFIED: ICD-10-CM

## 2022-03-20 RX ORDER — NIRMATRELVIR AND RITONAVIR 300-100 MG
20 X 150 MG & KIT ORAL
Qty: 5 | Refills: 0 | Status: ACTIVE | COMMUNITY
Start: 2022-03-20 | End: 1900-01-01

## 2022-03-20 RX ORDER — BENZONATATE 200 MG/1
200 CAPSULE ORAL 3 TIMES DAILY
Qty: 60 | Refills: 0 | Status: ACTIVE | COMMUNITY
Start: 2022-03-20 | End: 1900-01-01

## 2022-11-10 RX ORDER — AMOXICILLIN 400 MG/5ML
400 FOR SUSPENSION ORAL TWICE DAILY
Qty: 1 | Refills: 0 | Status: ACTIVE | COMMUNITY
Start: 2022-11-10 | End: 1900-01-01

## 2023-08-22 ENCOUNTER — APPOINTMENT (OUTPATIENT)
Dept: PULMONOLOGY | Facility: CLINIC | Age: 73
End: 2023-08-22
Payer: COMMERCIAL

## 2023-08-22 VITALS
DIASTOLIC BLOOD PRESSURE: 96 MMHG | HEART RATE: 66 BPM | WEIGHT: 173.25 LBS | TEMPERATURE: 98.1 F | OXYGEN SATURATION: 93 % | RESPIRATION RATE: 15 BRPM | SYSTOLIC BLOOD PRESSURE: 186 MMHG | HEIGHT: 61 IN | BODY MASS INDEX: 32.71 KG/M2

## 2023-08-22 DIAGNOSIS — J47.9 BRONCHIECTASIS, UNCOMPLICATED: ICD-10-CM

## 2023-08-22 DIAGNOSIS — A31.9 MYCOBACTERIAL INFECTION, UNSPECIFIED: ICD-10-CM

## 2023-08-22 PROCEDURE — 99205 OFFICE O/P NEW HI 60 MIN: CPT

## 2023-08-22 RX ORDER — SODIUM CHLORIDE FOR INHALATION 3.5 %
3.5 VIAL, NEBULIZER (ML) INHALATION TWICE DAILY
Qty: 60 | Refills: 5 | Status: ACTIVE | COMMUNITY
Start: 2023-08-22 | End: 1900-01-01

## 2023-08-22 RX ORDER — ALBUTEROL SULFATE 2.5 MG/3ML
(2.5 MG/3ML) SOLUTION RESPIRATORY (INHALATION)
Qty: 60 | Refills: 5 | Status: ACTIVE | COMMUNITY
Start: 2023-08-22 | End: 1900-01-01
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
## 2023-08-22 NOTE — DISCUSSION/SUMMARY
[FreeTextEntry1] : 73-year-old woman, non-smoker, MAC on sputum culture, CT with stable nodules, old tiny, history of mild bronchiectasis in the past, normal pulmonary function tests other than no significant response to bronchodilators but with a baseline normal FEV1 and minimal hyperinflation.  Other than some thick phlegm that she has to cough up in the morning, she is currently asymptomatic.  I agree with the decision at this point not to treat the MAC.  The only thing I would add was more airway clearance in the form of albuterol, saline, and then followed by the [facility]  She will continue with her yearly follow-up CAT scans as ordered by her current pulmonologist as well.  Obviously plan may change if symptoms change or CAT scan appearance

## 2024-01-18 NOTE — PHYSICAL THERAPY INITIAL EVALUATION ADULT - NAME OF DISCHARGE PLANNER
We are committed to providing you the best care possible.    If you receive a survey after visiting one of our offices, please take time to share your experience concerning your physician office visit.  These surveys are confidential and no health information about you is shared.    We are eager to improve for you and we are counting on your feedback to help make that happen.       
NATALIE Arroyo notified

## 2024-03-26 NOTE — PRE-ANESTHESIA EVALUATION ADULT - NSANTHGENDERRD_ENT_A_CORE
Last OV: 7/12/2023  chronic   Last RX:    Next scheduled apt: Visit date not found          Pt requesting a refill     No